# Patient Record
Sex: FEMALE | Race: WHITE | NOT HISPANIC OR LATINO | Employment: FULL TIME | URBAN - METROPOLITAN AREA
[De-identification: names, ages, dates, MRNs, and addresses within clinical notes are randomized per-mention and may not be internally consistent; named-entity substitution may affect disease eponyms.]

---

## 2017-04-28 DIAGNOSIS — I25.10 CORONARY ATHEROSCLEROSIS OF NATIVE CORONARY ARTERY: ICD-10-CM

## 2017-04-28 RX ORDER — PRASUGREL 10 MG/1
10 TABLET, FILM COATED ORAL DAILY
Qty: 90 TABLET | Refills: 1 | Status: SHIPPED | OUTPATIENT
Start: 2017-04-28 | End: 2017-05-02

## 2017-04-28 RX ORDER — PRASUGREL 10 MG/1
10 TABLET, FILM COATED ORAL DAILY
Qty: 30 TABLET | Refills: 0 | Status: SHIPPED | OUTPATIENT
Start: 2017-04-28 | End: 2017-04-28

## 2017-05-02 ENCOUNTER — OFFICE VISIT (OUTPATIENT)
Dept: CARDIOLOGY | Facility: CLINIC | Age: 57
End: 2017-05-02
Payer: COMMERCIAL

## 2017-05-02 VITALS
BODY MASS INDEX: 26.05 KG/M2 | WEIGHT: 147 LBS | HEART RATE: 69 BPM | SYSTOLIC BLOOD PRESSURE: 123 MMHG | DIASTOLIC BLOOD PRESSURE: 79 MMHG | HEIGHT: 63 IN

## 2017-05-02 DIAGNOSIS — I21.09 ACUTE ANTERIOR WALL MI (H): Primary | ICD-10-CM

## 2017-05-02 DIAGNOSIS — I25.10 CORONARY ARTERY DISEASE INVOLVING NATIVE CORONARY ARTERY OF NATIVE HEART WITHOUT ANGINA PECTORIS: ICD-10-CM

## 2017-05-02 PROCEDURE — 99214 OFFICE O/P EST MOD 30 MIN: CPT | Performed by: INTERNAL MEDICINE

## 2017-05-02 RX ORDER — LOSARTAN POTASSIUM 25 MG/1
12.5 TABLET ORAL DAILY
Qty: 15 TABLET | Refills: 11 | Status: SHIPPED | OUTPATIENT
Start: 2017-05-02 | End: 2017-07-10

## 2017-05-02 NOTE — MR AVS SNAPSHOT
After Visit Summary   5/2/2017    Jaymie Mares    MRN: [id number]           Patient Information     Date Of Birth          1960        Visit Information        Provider Department      5/2/2017 4:00 PM Hector Chavez MD [facility]        Today's Diagnoses     Acute anterior wall MI (H)    -  1    Coronary artery disease involving native coronary artery of native heart without angina pectoris           Follow-ups after your visit        Additional Services     Follow-Up with Cardiologist                 Future tests that were ordered for you today     Open Future Orders        Priority Expected Expires Ordered    ALT Routine 5/2/2018 6/6/2018 5/2/2017    Follow-Up with Cardiologist Routine 5/2/2018 9/14/2018 5/2/2017    Lipid Profile Routine 5/2/2018 6/6/2018 5/2/2017            Who to contact     If you have questions or need follow up information about today's clinic visit or your schedule please contact [facility] directly at [phone].  Normal or non-critical lab and imaging results will be communicated to you by AmSafehart, letter or phone within 4 business days after the clinic has received the results. If you do not hear from us within 7 days, please contact the clinic through [employer] or phone. If you have a critical or abnormal lab result, we will notify you by phone as soon as possible.  Submit refill requests through Zaplox or call your pharmacy and they will forward the refill request to us. Please allow 3 business days for your refill to be completed.          Additional Information About Your Visit        AmSafehart Information     Zaplox gives you secure access to your electronic health record. If you see a primary care provider, you can also send messages to your care team and make appointments. If you have questions, please call your primary care clinic.  If you do not have a primary  "care provider, please call [phone] and they will assist you.        Care EveryWhere ID     This is your Care EveryWhere ID. This could be used by other organizations to access your [facility] medical records  [id number]        Your Vitals Were     Pulse Height BMI (Body Mass Index)             69 1.6 m (5' 3\") 26.04 kg/m2          Blood Pressure from Last 3 Encounters:   05/02/17 123/79   11/03/16 122/70   10/11/16 126/80    Weight from Last 3 Encounters:   05/02/17 66.7 kg (147 lb)   11/03/16 65.4 kg (144 lb 3.2 oz)   04/06/16 64.4 kg (142 lb)                 Today's Medication Changes          These changes are accurate as of: 5/2/17  4:32 PM.  If you have any questions, ask your nurse or doctor.               Start taking these medicines.        Dose/Directions    losartan 25 MG tablet   Commonly known as:  COZAAR   Used for:  Acute anterior wall MI (H), Coronary artery disease involving native coronary artery of native heart without angina pectoris   Started by:  Hector Chavez MD        Dose:  12.5 mg   Take 0.5 tablets (12.5 mg) by mouth daily   Quantity:  15 tablet   Refills:  11         Stop taking these medicines if you haven't already. Please contact your care team if you have questions.     prasugrel 10 MG Tabs tablet   Commonly known as:  EFFIENT   Stopped by:  Hector Chavez MD                Where to get your medicines      These medications were sent to [facility]/pharmacy #[id number] [place], [place] - [address] [address]     Phone:  [phone]     losartan 25 MG tablet                Primary Care Provider Office Phone # Fax #    Bárbara Esposito [phone]       [facility] [address] [facility] [address]        Thank you!     Thank you for choosing [facility] [place]  for your care. Our goal is always to provide you with excellent care. Hearing back from our patients " is one way we can continue to improve our services. Please take a few minutes to complete the written survey that you may receive in the mail after your visit with us. Thank you!             Your Updated Medication List - Protect others around you: Learn how to safely use, store and throw away your medicines at www.disposemymeds.org.          This list is accurate as of: 5/2/17  4:32 PM.  Always use your most recent med list.                   Brand Name Dispense Instructions for use    aspirin 81 MG tablet     100 tablet    Take 1 tablet (81 mg) by mouth daily INDICATION: FOR HEART AND CARDIOVASCULAR HEALTH       [id number] Tabs     100 tablet    Take 1 capsule by mouth daily INDICATION: THIAMINE/VITAMIN DEFICIENCY (MUST HAVE AT LEAST 100 MG OF THIAMINE)       Calcium Carb-Cholecalciferol 1000-800 MG-UNIT Tabs    CALCIUM 1000 + D    100 tablet    Take 1 tablet by mouth daily TAKE WITH FOOD, FOR BONE HEALTH AND FOR VITAMIN D SUPPLEMENTATION       folic acid 1 MG tablet    FOLVITE    100 tablet    Take 1 tablet (1 mg) by mouth daily INDICATION: VITAMIN SUPPLEMENTATION       * levothyroxine 125 MCG tablet    SYNTHROID/LEVOTHROID    90 tablet    Take 1 tablet (125 mcg) by mouth daily INDICATION: THYROID SUPPLEMENTATION, TAKE FIRST THING IN THE MORNING ON AN EMPTY STOMACH,  NO FOOD FOR AN HOUR       * levothyroxine 25 MCG tablet    SYNTHROID/LEVOTHROID    30 tablet    Take 1 tablet (25 mcg) by mouth daily INDICATION: THYROID SUPPLEMENTATION, TAKE FIRST THING IN THE MORNING ON AN EMPTY STOMACH, NO FOOD FOR AN HOUR, SATURDAYS AND SUNDAYS ONLY       losartan 25 MG tablet    COZAAR    15 tablet    Take 0.5 tablets (12.5 mg) by mouth daily       metoprolol 100 MG 24 hr tablet    TOPROL-XL    90 tablet    Take 0.5 tablets (50 mg) by mouth At Bedtime TO LOWER BLOOD PRESSURE AND FOR HEART HEALTH       rosuvastatin 20 MG tablet    CRESTOR    90 tablet    Take 1 tablet (20 mg) by mouth daily INDICATION: TO LOWER CHOLESTEROL  AND TO HELP REDUCE RISK OF HEART ATTACK AND STROKE       vitamin D3 17321 UNITS capsule    CHOLECALCIFEROL    40 capsule    TAKE ONE CAP EVERY OTHER WEEK, FOR VITAMIN D DEFICIENCY (1ST AND 15TH OF EACH MONTH)       * Notice:  This list has 2 medication(s) that are the same as other medications prescribed for you. Read the directions carefully, and ask your doctor or other care provider to review them with you.

## 2017-05-02 NOTE — PROGRESS NOTES
HPI and Plan:   See dictation    Orders Placed This Encounter   Procedures     Lipid Profile     ALT     Follow-Up with Cardiologist       Orders Placed This Encounter   Medications     losartan (COZAAR) 25 MG tablet     Sig: Take 0.5 tablets (12.5 mg) by mouth daily     Dispense:  15 tablet     Refill:  11       Medications Discontinued During This Encounter   Medication Reason     prasugrel (EFFIENT) 10 MG TABS tablet          Encounter Diagnoses   Name Primary?     Acute anterior wall MI (H) Yes     Coronary artery disease involving native coronary artery of native heart without angina pectoris        CURRENT MEDICATIONS:  Current Outpatient Prescriptions   Medication Sig Dispense Refill     losartan (COZAAR) 25 MG tablet Take 0.5 tablets (12.5 mg) by mouth daily 15 tablet 11     rosuvastatin (CRESTOR) 20 MG tablet Take 1 tablet (20 mg) by mouth daily INDICATION: TO LOWER CHOLESTEROL AND TO HELP REDUCE RISK OF HEART ATTACK AND STROKE 90 tablet 3     levothyroxine (SYNTHROID, LEVOTHROID) 125 MCG tablet Take 1 tablet (125 mcg) by mouth daily INDICATION: THYROID SUPPLEMENTATION, TAKE FIRST THING IN THE MORNING ON AN EMPTY STOMACH,  NO FOOD FOR AN HOUR 90 tablet PRN     levothyroxine (SYNTHROID, LEVOTHROID) 25 MCG tablet Take 1 tablet (25 mcg) by mouth daily INDICATION: THYROID SUPPLEMENTATION, TAKE FIRST THING IN THE MORNING ON AN EMPTY STOMACH, NO FOOD FOR AN HOUR, SATURDAYS AND SUNDAYS ONLY 30 tablet 3     folic acid (FOLVITE) 1 MG tablet Take 1 tablet (1 mg) by mouth daily INDICATION: VITAMIN SUPPLEMENTATION 100 tablet 3     B Complex-Biotin-FA ([id number]) TABS Take 1 capsule by mouth daily INDICATION: THIAMINE/VITAMIN DEFICIENCY (MUST HAVE AT LEAST 100 MG OF THIAMINE) 100 tablet PRN     Calcium Carb-Cholecalciferol (CALCIUM 1000 + D) 1000-800 MG-UNIT TABS Take 1 tablet by mouth daily TAKE WITH FOOD, FOR BONE HEALTH AND FOR VITAMIN D SUPPLEMENTATION 100 tablet PRN     aspirin 81 MG tablet Take 1 tablet (81  mg) by mouth daily INDICATION: FOR HEART AND CARDIOVASCULAR HEALTH 100 tablet 3     metoprolol (TOPROL-XL) 100 MG 24 hr tablet Take 0.5 tablets (50 mg) by mouth At Bedtime TO LOWER BLOOD PRESSURE AND FOR HEART HEALTH 90 tablet 2     vitamin D3 (CHOLECALCIFEROL) 38328 UNITS capsule TAKE ONE CAP EVERY OTHER WEEK, FOR VITAMIN D DEFICIENCY (1ST AND 15TH OF EACH MONTH) 40 capsule PRN       ALLERGIES     Allergies   Allergen Reactions     Lisinopril      hypotension     Sulfa Drugs      Zithromax [Azithromycin]        PAST MEDICAL HISTORY:  Past Medical History:   Diagnosis Date     Cardiac arrest (H)     V fib cardiac arrest. February 2010     Coronary artery disease      Hyperlipidaemia      Hypothyroidism      Ischemic cardiomyopathy     EF now normalized     Myocardial infarction (H)     Anterior MI February 2010       PAST SURGICAL HISTORY:  Past Surgical History:   Procedure Laterality Date     CARDIAC CATHERIZATION       CORONARY ANGIOGRAPHY ADULT ORDER      Emergent cath 2/21/10- SUPRIYA to proximal LAD       FAMILY HISTORY:  Family History   Problem Relation Age of Onset     CANCER Father 74     LUNG     Prostate Cancer Father      GASTROINTESTINAL DISEASE Mother        SOCIAL HISTORY:  Social History     Social History     Marital status:      Spouse name: N/A     Number of children: N/A     Years of education: N/A     Social History Main Topics     Smoking status: Former Smoker     Packs/day: 0.20     Years: 15.00     Types: Cigarettes     Quit date: 2/12/2010     Smokeless tobacco: Never Used     Alcohol use Yes      Comment: Occasional glass of wine.     Drug use: No     Sexual activity: Yes     Partners: Male     Other Topics Concern     None     Social History Narrative       Review of Systems:  Skin:  Negative       Eyes:  Positive for glasses    ENT:  Negative      Respiratory:  Negative       Cardiovascular:  Negative      Gastroenterology: Negative      Genitourinary:  Negative      Musculoskeletal:   "Negative      Neurologic:  Negative      Psychiatric:  Negative      Heme/Lymph/Imm:  Negative      Endocrine:  Positive for thyroid disorder      Physical Exam:  Vitals: /79  Pulse 69  Ht 1.6 m (5' 3\")  Wt 66.7 kg (147 lb)  BMI 26.04 kg/m2    Constitutional:  cooperative, alert and oriented, well developed, well nourished, in no acute distress        Skin:  warm and dry to the touch, no apparent skin lesions or masses noted        Head:  normocephalic, no masses or lesions        Eyes:  pupils equal and round, conjunctivae and lids unremarkable, sclera white, no xanthalasma, EOMS intact, no nystagmus        ENT:  no pallor or cyanosis, dentition good        Neck:  carotid pulses are full and equal bilaterally, JVP normal, no carotid bruit, no thyromegaly        Chest:  normal breath sounds, clear to auscultation, normal A-P diameter, normal symmetry, normal respiratory excursion, no use of accessory muscles          Cardiac: regular rhythm, normal S1/S2, no S3 or S4, apical impulse not displaced, no murmurs, gallops or rubs                  Abdomen:  abdomen soft, non-tender, BS normoactive, no mass, no HSM, no bruits        Vascular: pulses full and equal, no bruits auscultated                                        Extremities and Back:  no deformities, clubbing, cyanosis, erythema observed              Neurological:  affect appropriate, oriented to time, person and place;no gross motor deficits              CC  No referring provider defined for this encounter.              "

## 2017-05-02 NOTE — LETTER
5/2/2017    Bárbara Esposito MD  [facility]   [address] [facility] [address]    RE: Jaymie Mares       Dear Colleague,    It was my pleasure to see your patient, Jaymie Mares, in followup.  As you know, this is a patient who suffered an acute anterior myocardial infarct approximately 7 years ago.  This resulted in a VF arrest.  She was resuscitated and was found to have an occluded left anterior descending artery, which was intervened upon and stented.  The patient had 10%-20% disease in the proximal to midsegment, minimal luminal irregularities were in the left circumflex artery.        Fortunately for the patient, her ejection fraction did return to normal.  Her left ventricular function on left ventricular angiography at the time of the infarct was felt to be severely reduced with an EF of approximately 25%.  Her most recent echocardiogram was performed on 11/16/2015 showing an EF of 55%-60% with no wall motion abnormalities.  She has no significant valvular heart disease either.        It does appear that the patient's major risk factor for coronary artery disease with smoking.  She has never smoked since the infarct.  I do note that the patient is on beta blockers but she is not on an ACE inhibitor.  Looking back at Dr. Amador's note, who was her original cardiologist until he retired from our group, she became symptomatically hypotensive with even the lowest doses of lisinopril.        Last lipid profile was on 10/24 showing an LDL of 106, HDL of 89 and triglycerides of 149 with a total cholesterol of 225.  This is significantly worse than her cholesterol on 05/08/2014 when her LDL was 41, HDL was 102 and triglycerides were 63.  I believe that you changed her statin to rosuvastatin 20 mg per day.  The patient has been on prasugrel for the last 7 years which is a long time to be on this medication.  Dual-antiplatelet therapy is usually continued long-term in patients who have more  diffuse disease or ongoing risk factors such as diabetes mellitus.  I do not think that this patient falls into that category.  She only had significant LAD disease.  Her circumflex was near normal and her right coronary artery had only trivial disease, and she of course has stopped her major risk factor, which was smoking.  Her blood pressure is well controlled here today at 123/79.        The patient is asymptomatic with no chest pains, no chest pressure and no unusual shortness of breath.     Outpatient Encounter Prescriptions as of 5/2/2017   Medication Sig Dispense Refill     losartan (COZAAR) 25 MG tablet Take 0.5 tablets (12.5 mg) by mouth daily 15 tablet 11     rosuvastatin (CRESTOR) 20 MG tablet Take 1 tablet (20 mg) by mouth daily INDICATION: TO LOWER CHOLESTEROL AND TO HELP REDUCE RISK OF HEART ATTACK AND STROKE 90 tablet 3     levothyroxine (SYNTHROID, LEVOTHROID) 125 MCG tablet Take 1 tablet (125 mcg) by mouth daily INDICATION: THYROID SUPPLEMENTATION, TAKE FIRST THING IN THE MORNING ON AN EMPTY STOMACH,  NO FOOD FOR AN HOUR 90 tablet PRN     levothyroxine (SYNTHROID, LEVOTHROID) 25 MCG tablet Take 1 tablet (25 mcg) by mouth daily INDICATION: THYROID SUPPLEMENTATION, TAKE FIRST THING IN THE MORNING ON AN EMPTY STOMACH, NO FOOD FOR AN HOUR, SATURDAYS AND SUNDAYS ONLY 30 tablet 3     folic acid (FOLVITE) 1 MG tablet Take 1 tablet (1 mg) by mouth daily INDICATION: VITAMIN SUPPLEMENTATION 100 tablet 3     B Complex-Biotin-FA ([id number]) TABS Take 1 capsule by mouth daily INDICATION: THIAMINE/VITAMIN DEFICIENCY (MUST HAVE AT LEAST 100 MG OF THIAMINE) 100 tablet PRN     Calcium Carb-Cholecalciferol (CALCIUM 1000 + D) 1000-800 MG-UNIT TABS Take 1 tablet by mouth daily TAKE WITH FOOD, FOR BONE HEALTH AND FOR VITAMIN D SUPPLEMENTATION 100 tablet PRN     aspirin 81 MG tablet Take 1 tablet (81 mg) by mouth daily INDICATION: FOR HEART AND CARDIOVASCULAR HEALTH 100 tablet 3     metoprolol (TOPROL-XL) 100 MG 24  hr tablet Take 0.5 tablets (50 mg) by mouth At Bedtime TO LOWER BLOOD PRESSURE AND FOR HEART HEALTH 90 tablet 2     vitamin D3 (CHOLECALCIFEROL) 60266 UNITS capsule TAKE ONE CAP EVERY OTHER WEEK, FOR VITAMIN D DEFICIENCY (1ST AND 15TH OF EACH MONTH) 40 capsule PRN     [DISCONTINUED] prasugrel (EFFIENT) 10 MG TABS tablet Take 1 tablet (10 mg) by mouth daily 90 tablet 1     No facility-administered encounter medications on file as of 5/2/2017.       IMPRESSION:   1.  Status post anterior myocardial infarct and resuscitated VF arrest.  The patient is doing remarkably well and her ejection fraction is normal and she has no symptoms of angina pectoris.   2.  Lipid profile in 10/2016 was not near as good as previous lipid profiles.  She is due to have lipids rechecked at your office, I believe, at the end of this month or early next month.     3.  The patient has been on long-term prasugrel for 7 years.  She does have increased bruising.  She probably does not need to be on prasugrel at this stage as she does not fulfill the usual criteria used for long-term dual antiplatelet therapy.      PLAN:     1.  Firstly, I would stop prasugrel medication, but she must continue the aspirin 81 mg per day.     2.  Secondly, we would certainly like to see the LDL cholesterol significantly lower than it was in October, but I know that you are checking her lipids in the next few weeks.     3.  Though she did not tolerate low-dose lisinopril, I would like to see if she could tolerate low dose losartan 12.5 mg.  She is taking the metoprolol at nighttime, so I have asked her to take the losartan in the morning so she does not get a combined peak dose effect.  If she starts to become dizzy and lightheaded, I told her to stop the losartan immediately and let us know.        It has been a pleasure to be involved in the care of this really pleasant patient who has done so well after a life-threatening event.  As always, she has been told to  contact me if she has any questions or any concerns.     Sincerely,    Hector Chavez MD    [facility]

## 2017-05-03 NOTE — PROGRESS NOTES
HISTORY OF PRESENT ILLNESS:  It was my pleasure to see your patient, Jaymie Mares, in followup.  As you know, this is a patient who suffered an acute anterior myocardial infarct approximately 7 years ago.  This resulted in a VF arrest.  She was resuscitated and was found to have an occluded left anterior descending artery, which was intervened upon and stented.  The patient had 10%-20% disease in the proximal to midsegment, minimal luminal irregularities were in the left circumflex artery.        Fortunately for the patient, her ejection fraction did return to normal.  Her left ventricular function on left ventricular angiography at the time of the infarct was felt to be severely reduced with an EF of approximately 25%.  Her most recent echocardiogram was performed on 11/16/2015 showing an EF of 55%-60% with no wall motion abnormalities.  She has no significant valvular heart disease either.        It does appear that the patient's major risk factor for coronary artery disease with smoking.  She has never smoked since the infarct.  I do note that the patient is on beta blockers but she is not on an ACE inhibitor.  Looking back at Dr. Amador's note, who was her original cardiologist until he retired from our group, she became symptomatically hypotensive with even the lowest doses of lisinopril.        Last lipid profile was on 10/24 showing an LDL of 106, HDL of 89 and triglycerides of 149 with a total cholesterol of 225.  This is significantly worse than her cholesterol on 05/08/2014 when her LDL was 41, HDL was 102 and triglycerides were 63.  I believe that you changed her statin to rosuvastatin 20 mg per day.  The patient has been on prasugrel for the last 7 years which is a long time to be on this medication.  Dual-antiplatelet therapy is usually continued long-term in patients who have more diffuse disease or ongoing risk factors such as diabetes mellitus.  I do not think that this patient falls into that  category.  She only had significant LAD disease.  Her circumflex was near normal and her right coronary artery had only trivial disease, and she of course has stopped her major risk factor, which was smoking.  Her blood pressure is well controlled here today at 123/79.        The patient is asymptomatic with no chest pains, no chest pressure and no unusual shortness of breath.      IMPRESSION:   1.  Status post anterior myocardial infarct and resuscitated VF arrest.  The patient is doing remarkably well and her ejection fraction is normal and she has no symptoms of angina pectoris.   2.  Lipid profile in 10/2016 was not near as good as previous lipid profiles.  She is due to have lipids rechecked at your office, I believe, at the end of this month or early next month.     3.  The patient has been on long-term prasugrel for 7 years.  She does have increased bruising.  She probably does not need to be on prasugrel at this stage as she does not fulfill the usual criteria used for long-term dual antiplatelet therapy.      PLAN:     1.  Firstly, I would stop prasugrel medication, but she must continue the aspirin 81 mg per day.     2.  Secondly, we would certainly like to see the LDL cholesterol significantly lower than it was in October, but I know that you are checking her lipids in the next few weeks.     3.  Though she did not tolerate low-dose lisinopril, I would like to see if she could tolerate low dose losartan 12.5 mg.  She is taking the metoprolol at nighttime, so I have asked her to take the losartan in the morning so she does not get a combined peak dose effect.  If she starts to become dizzy and lightheaded, I told her to stop the losartan immediately and let us know.        It has been a pleasure to be involved in the care of this really pleasant patient who has done so well after a life-threatening event.  As always, she has been told to contact me if she has any questions or any concerns.      cc:    Bárbara Esposito MD    [facility]   [address] [place] [address]         BEVERLY RUBALCAVA MD, [facility]             D: 2017 16:31   T: 2017 12:29   MT: MALISSA      Name:     GLENDA FULTON   MRN:      [id number]        Account:      [id number]   :      1960           Service Date: 2017      Document: [id number]

## 2017-05-08 DIAGNOSIS — I25.2 PAST MYOCARDIAL INFARCTION: ICD-10-CM

## 2017-05-09 RX ORDER — METOPROLOL SUCCINATE 100 MG/1
TABLET, EXTENDED RELEASE ORAL
Qty: 90 TABLET | Refills: 1 | Status: SHIPPED | OUTPATIENT
Start: 2017-05-09 | End: 2017-10-23

## 2017-05-09 NOTE — TELEPHONE ENCOUNTER
Prescription approved per [facility] Refill Protocol.

## 2017-07-10 DIAGNOSIS — I25.10 CORONARY ARTERY DISEASE INVOLVING NATIVE CORONARY ARTERY OF NATIVE HEART WITHOUT ANGINA PECTORIS: ICD-10-CM

## 2017-07-10 DIAGNOSIS — I21.09 ACUTE ANTERIOR WALL MI (H): ICD-10-CM

## 2017-07-10 RX ORDER — LOSARTAN POTASSIUM 25 MG/1
12.5 TABLET ORAL DAILY
Qty: 15 TABLET | Refills: 9 | Status: SHIPPED | OUTPATIENT
Start: 2017-07-10 | End: 2017-07-27

## 2017-07-27 ENCOUNTER — TELEPHONE (OUTPATIENT)
Dept: NURSING | Facility: CLINIC | Age: 57
End: 2017-07-27

## 2017-07-27 ENCOUNTER — NURSE TRIAGE (OUTPATIENT)
Dept: NURSING | Facility: CLINIC | Age: 57
End: 2017-07-27

## 2017-07-27 DIAGNOSIS — I21.09 ACUTE ANTERIOR WALL MI (H): ICD-10-CM

## 2017-07-27 DIAGNOSIS — I25.10 CORONARY ARTERY DISEASE INVOLVING NATIVE CORONARY ARTERY OF NATIVE HEART WITHOUT ANGINA PECTORIS: ICD-10-CM

## 2017-07-27 RX ORDER — LOSARTAN POTASSIUM 25 MG/1
12.5 TABLET ORAL DAILY
Qty: 45 TABLET | Refills: 3 | Status: SHIPPED | OUTPATIENT
Start: 2017-07-27 | End: 2017-10-23

## 2017-07-27 NOTE — TELEPHONE ENCOUNTER
Needs 90 day supply of medication for insurance to pay for it.  Cozzar new RX needed. Pharmacy [facility] in [place] at [address].  Please call.   Vaishnavi Figueroa RN-[facility]

## 2017-09-05 ENCOUNTER — MYC MEDICAL ADVICE (OUTPATIENT)
Dept: INTERNAL MEDICINE | Facility: CLINIC | Age: 57
End: 2017-09-05

## 2017-09-20 NOTE — TELEPHONE ENCOUNTER
Pt called back.    Pt is scheduled for 10/19  With a Lab appt 1 week earlier  Please place labs you want drawn

## 2017-10-12 DIAGNOSIS — E78.5 HYPERLIPIDEMIA LDL GOAL <70: ICD-10-CM

## 2017-10-12 DIAGNOSIS — E83.19 IRON EXCESS: ICD-10-CM

## 2017-10-12 DIAGNOSIS — E03.9 HYPOTHYROIDISM, UNSPECIFIED TYPE: ICD-10-CM

## 2017-10-12 DIAGNOSIS — E56.9 VITAMIN DEFICIENCY: ICD-10-CM

## 2017-10-12 LAB
ALBUMIN SERPL-MCNC: 3.8 G/DL (ref 3.4–5)
ALP SERPL-CCNC: 81 U/L (ref 40–150)
ALT SERPL W P-5'-P-CCNC: 28 U/L (ref 0–50)
ANION GAP SERPL CALCULATED.3IONS-SCNC: 9 MMOL/L (ref 3–14)
AST SERPL W P-5'-P-CCNC: 18 U/L (ref 0–45)
BILIRUB SERPL-MCNC: 0.4 MG/DL (ref 0.2–1.3)
BUN SERPL-MCNC: 16 MG/DL (ref 7–30)
CALCIUM SERPL-MCNC: 8.9 MG/DL (ref 8.5–10.1)
CHLORIDE SERPL-SCNC: 108 MMOL/L (ref 94–109)
CHOLEST SERPL-MCNC: 145 MG/DL
CO2 SERPL-SCNC: 23 MMOL/L (ref 20–32)
CREAT SERPL-MCNC: 1.11 MG/DL (ref 0.52–1.04)
FERRITIN SERPL-MCNC: 159 NG/ML (ref 8–252)
GFR SERPL CREATININE-BSD FRML MDRD: 51 ML/MIN/1.7M2
GLUCOSE SERPL-MCNC: 92 MG/DL (ref 70–99)
HDLC SERPL-MCNC: 101 MG/DL
IRON SATN MFR SERPL: 29 % (ref 15–46)
IRON SERPL-MCNC: 99 UG/DL (ref 35–180)
LDLC SERPL CALC-MCNC: 25 MG/DL
NONHDLC SERPL-MCNC: 44 MG/DL
POTASSIUM SERPL-SCNC: 4 MMOL/L (ref 3.4–5.3)
PROT SERPL-MCNC: 7.7 G/DL (ref 6.8–8.8)
SODIUM SERPL-SCNC: 140 MMOL/L (ref 133–144)
TIBC SERPL-MCNC: 341 UG/DL (ref 240–430)
TRIGL SERPL-MCNC: 94 MG/DL
TSH SERPL DL<=0.005 MIU/L-ACNC: 2.53 MU/L (ref 0.4–4)
VIT B12 SERPL-MCNC: 1401 PG/ML (ref 193–986)

## 2017-10-12 PROCEDURE — 82728 ASSAY OF FERRITIN: CPT | Performed by: INTERNAL MEDICINE

## 2017-10-12 PROCEDURE — 82607 VITAMIN B-12: CPT | Performed by: INTERNAL MEDICINE

## 2017-10-12 PROCEDURE — 36415 COLL VENOUS BLD VENIPUNCTURE: CPT | Performed by: INTERNAL MEDICINE

## 2017-10-12 PROCEDURE — 83540 ASSAY OF IRON: CPT | Performed by: INTERNAL MEDICINE

## 2017-10-12 PROCEDURE — 83550 IRON BINDING TEST: CPT | Performed by: INTERNAL MEDICINE

## 2017-10-12 PROCEDURE — 80061 LIPID PANEL: CPT | Performed by: INTERNAL MEDICINE

## 2017-10-12 PROCEDURE — 84443 ASSAY THYROID STIM HORMONE: CPT | Performed by: INTERNAL MEDICINE

## 2017-10-12 PROCEDURE — 80053 COMPREHEN METABOLIC PANEL: CPT | Performed by: INTERNAL MEDICINE

## 2017-10-23 ENCOUNTER — OFFICE VISIT (OUTPATIENT)
Dept: INTERNAL MEDICINE | Facility: CLINIC | Age: 57
End: 2017-10-23
Payer: COMMERCIAL

## 2017-10-23 VITALS
HEART RATE: 85 BPM | BODY MASS INDEX: 26.36 KG/M2 | TEMPERATURE: 98.7 F | RESPIRATION RATE: 18 BRPM | DIASTOLIC BLOOD PRESSURE: 70 MMHG | SYSTOLIC BLOOD PRESSURE: 110 MMHG | OXYGEN SATURATION: 98 % | WEIGHT: 148.8 LBS

## 2017-10-23 DIAGNOSIS — Z78.0 ASYMPTOMATIC POSTMENOPAUSAL STATUS: ICD-10-CM

## 2017-10-23 DIAGNOSIS — Z12.31 VISIT FOR SCREENING MAMMOGRAM: ICD-10-CM

## 2017-10-23 DIAGNOSIS — Z23 NEED FOR PROPHYLACTIC VACCINATION AND INOCULATION AGAINST INFLUENZA: ICD-10-CM

## 2017-10-23 DIAGNOSIS — I21.09 ACUTE ANTERIOR WALL MI (H): ICD-10-CM

## 2017-10-23 DIAGNOSIS — E55.9 VITAMIN D DEFICIENCY: ICD-10-CM

## 2017-10-23 DIAGNOSIS — E03.9 HYPOTHYROIDISM, UNSPECIFIED TYPE: ICD-10-CM

## 2017-10-23 DIAGNOSIS — I25.10 CORONARY ARTERY DISEASE INVOLVING NATIVE CORONARY ARTERY OF NATIVE HEART WITHOUT ANGINA PECTORIS: Primary | ICD-10-CM

## 2017-10-23 DIAGNOSIS — E56.9 VITAMIN DEFICIENCY: ICD-10-CM

## 2017-10-23 DIAGNOSIS — E78.5 HYPERLIPIDEMIA LDL GOAL <70: ICD-10-CM

## 2017-10-23 DIAGNOSIS — I25.2 PAST MYOCARDIAL INFARCTION: ICD-10-CM

## 2017-10-23 DIAGNOSIS — Z12.11 SPECIAL SCREENING FOR MALIGNANT NEOPLASMS, COLON: ICD-10-CM

## 2017-10-23 DIAGNOSIS — E51.11 THIAMINE DEFICIENCY NEUROPATHY: ICD-10-CM

## 2017-10-23 DIAGNOSIS — R79.89 ELEVATED SERUM CREATININE: ICD-10-CM

## 2017-10-23 DIAGNOSIS — Z87.891 HISTORY OF TOBACCO USE: ICD-10-CM

## 2017-10-23 LAB
ANION GAP SERPL CALCULATED.3IONS-SCNC: 6 MMOL/L (ref 3–14)
BUN SERPL-MCNC: 14 MG/DL (ref 7–30)
CALCIUM SERPL-MCNC: 9 MG/DL (ref 8.5–10.1)
CHLORIDE SERPL-SCNC: 109 MMOL/L (ref 94–109)
CO2 SERPL-SCNC: 26 MMOL/L (ref 20–32)
CREAT SERPL-MCNC: 1.04 MG/DL (ref 0.52–1.04)
GFR SERPL CREATININE-BSD FRML MDRD: 55 ML/MIN/1.7M2
GLUCOSE SERPL-MCNC: 97 MG/DL (ref 70–99)
POTASSIUM SERPL-SCNC: 4 MMOL/L (ref 3.4–5.3)
SODIUM SERPL-SCNC: 141 MMOL/L (ref 133–144)

## 2017-10-23 PROCEDURE — 99214 OFFICE O/P EST MOD 30 MIN: CPT | Mod: 25 | Performed by: INTERNAL MEDICINE

## 2017-10-23 PROCEDURE — 82306 VITAMIN D 25 HYDROXY: CPT | Performed by: INTERNAL MEDICINE

## 2017-10-23 PROCEDURE — 80048 BASIC METABOLIC PNL TOTAL CA: CPT | Performed by: INTERNAL MEDICINE

## 2017-10-23 PROCEDURE — 90471 IMMUNIZATION ADMIN: CPT | Performed by: INTERNAL MEDICINE

## 2017-10-23 PROCEDURE — 36415 COLL VENOUS BLD VENIPUNCTURE: CPT | Performed by: INTERNAL MEDICINE

## 2017-10-23 PROCEDURE — 90686 IIV4 VACC NO PRSV 0.5 ML IM: CPT | Performed by: INTERNAL MEDICINE

## 2017-10-23 PROCEDURE — G0296 VISIT TO DETERM LDCT ELIG: HCPCS | Performed by: INTERNAL MEDICINE

## 2017-10-23 RX ORDER — B-COMPLEX WITH VITAMIN C
1 TABLET ORAL DAILY
Qty: 100 TABLET | Status: SHIPPED | OUTPATIENT
Start: 2017-10-23

## 2017-10-23 RX ORDER — ROSUVASTATIN CALCIUM 20 MG/1
20 TABLET, COATED ORAL DAILY
Qty: 90 TABLET | Refills: 3 | Status: SHIPPED | OUTPATIENT
Start: 2017-10-23 | End: 2017-10-23

## 2017-10-23 RX ORDER — ROSUVASTATIN CALCIUM 20 MG/1
20 TABLET, COATED ORAL DAILY
Qty: 90 TABLET | Refills: 3 | Status: SHIPPED | OUTPATIENT
Start: 2017-10-23 | End: 2021-06-15

## 2017-10-23 RX ORDER — FOLIC ACID 1 MG/1
1 TABLET ORAL DAILY
Qty: 100 TABLET | Refills: 3 | Status: SHIPPED | OUTPATIENT
Start: 2017-10-23 | End: 2017-10-23

## 2017-10-23 RX ORDER — CHOLECALCIFEROL (VITAMIN D3) 1250 MCG
CAPSULE ORAL
Qty: 40 CAPSULE | Status: SHIPPED | OUTPATIENT
Start: 2017-10-23

## 2017-10-23 RX ORDER — B-COMPLEX WITH VITAMIN C
1 TABLET ORAL DAILY
Qty: 100 TABLET | Status: SHIPPED | OUTPATIENT
Start: 2017-10-23 | End: 2017-10-23

## 2017-10-23 RX ORDER — LOSARTAN POTASSIUM 25 MG/1
12.5 TABLET ORAL DAILY
Qty: 45 TABLET | Refills: 3 | Status: SHIPPED | OUTPATIENT
Start: 2017-10-23 | End: 2018-11-15

## 2017-10-23 RX ORDER — LEVOTHYROXINE SODIUM 137 UG/1
137 TABLET ORAL DAILY
Qty: 90 TABLET | Refills: 3 | Status: SHIPPED | OUTPATIENT
Start: 2017-10-23 | End: 2022-05-10

## 2017-10-23 RX ORDER — FOLIC ACID 1 MG/1
1 TABLET ORAL DAILY
Qty: 100 TABLET | Refills: 3 | Status: SHIPPED | OUTPATIENT
Start: 2017-10-23 | End: 2022-05-09

## 2017-10-23 RX ORDER — LOSARTAN POTASSIUM 25 MG/1
12.5 TABLET ORAL DAILY
Qty: 45 TABLET | Refills: 3 | Status: SHIPPED | OUTPATIENT
Start: 2017-10-23 | End: 2017-10-23

## 2017-10-23 NOTE — NURSING NOTE
"Chief Complaint   Patient presents with     Thyroid Problem     Results     vitamin deficiencies     Lipids       Initial /70  Pulse 85  Temp 98.7  F (37.1  C) (Oral)  Resp 18  Wt 148 lb 12.8 oz (67.5 kg)  SpO2 98%  BMI 26.36 kg/m2 Estimated body mass index is 26.36 kg/(m^2) as calculated from the following:    Height as of 5/2/17: 5' 3\" (1.6 m).    Weight as of this encounter: 148 lb 12.8 oz (67.5 kg).  Blood pressure completed using cuff size: regular    "

## 2017-10-23 NOTE — PROGRESS NOTES
SUBJECTIVE:   Jaymie Mares is a 57 year old female who presents to clinic today for the following health issues:      Hyperlipidemia Follow-Up      Rate your low fat/cholesterol diet?: good    Taking statin?  Yes, no muscle aches from statin    Other lipid medications/supplements?:  none    Hypothyroidism Follow-up      Since last visit, patient describes the following symptoms: weight gain of 8 # lbs    Amount of exercise or physical activity: 4-5 days/week for an average of 45-60 minutes    Problems taking medications regularly: No    Medication side effects: none    Diet: regular (no restrictions), low salt and low fat/cholesterol        Jaymie reports feeling really good. she reports feeling better with taking vitamin supplements and has noted significant improvement with regards to fatigue and muscle aches and pains.  Alma denies shortness of breath, chest pain, or decreased exercise tolerance. She was recently seen by cardiology and given a clean bill of health. Her recent labs show that her cholesterol panel is pristine and LDL has decreased to 25 mg percent. Her diet is very healthy and she exercises for about 45 minutes 4 days a week by brisk walking and also does core exercises everyday.    Problem list and histories reviewed & adjusted, as indicated.  Additional history: as documented    Labs reviewed in EPIC    Reviewed and updated as needed this visit by clinical staffTobacco  Allergies  Med Hx  Surg Hx  Fam Hx  Soc Hx      She is however not up-to-date with regards to some health maintenance parameters. She is overdue for colonoscopy, she also needs to have a mammogram and bone density scan done.         ROS:  14 point ROS reviewed in detail and negative     OBJECTIVE:     /70  Pulse 85  Temp 98.7  F (37.1  C) (Oral)  Resp 18  Wt 148 lb 12.8 oz (67.5 kg)  SpO2 98%  BMI 26.36 kg/m2  Body mass index is 26.36 kg/(m^2).  GENERAL: healthy, alert and no distress  EYES: Eyes grossly  normal to inspection, PERRL and conjunctivae and sclerae normal  NECK: no adenopathy, no asymmetry, masses, or scars and thyroid normal to palpation  RESP: lungs clear to auscultation - no rales, rhonchi or wheezes  CV: regular rate and rhythm, normal S1 S2, no S3 or S4, no murmur, click or rub, no peripheral edema and peripheral pulses strong  ABDOMEN: soft, nontender, no hepatosplenomegaly, no masses and bowel sounds normal  MS: no gross musculoskeletal defects noted, no edema    Diagnostic Test Results:  Results for orders placed or performed in visit on 10/12/17   TSH   Result Value Ref Range    TSH 2.53 0.40 - 4.00 mU/L   Lipid panel reflex to direct LDL   Result Value Ref Range    Cholesterol 145 <200 mg/dL    Triglycerides 94 <150 mg/dL    HDL Cholesterol 101 >49 mg/dL    LDL Cholesterol Calculated 25 <100 mg/dL    Non HDL Cholesterol 44 <130 mg/dL   Comprehensive metabolic panel   Result Value Ref Range    Sodium 140 133 - 144 mmol/L    Potassium 4.0 3.4 - 5.3 mmol/L    Chloride 108 94 - 109 mmol/L    Carbon Dioxide 23 20 - 32 mmol/L    Anion Gap 9 3 - 14 mmol/L    Glucose 92 70 - 99 mg/dL    Urea Nitrogen 16 7 - 30 mg/dL    Creatinine 1.11 (H) 0.52 - 1.04 mg/dL    GFR Estimate 51 (L) >60 mL/min/1.7m2    GFR Estimate If Black 62 >60 mL/min/1.7m2    Calcium 8.9 8.5 - 10.1 mg/dL    Bilirubin Total 0.4 0.2 - 1.3 mg/dL    Albumin 3.8 3.4 - 5.0 g/dL    Protein Total 7.7 6.8 - 8.8 g/dL    Alkaline Phosphatase 81 40 - 150 U/L    ALT 28 0 - 50 U/L    AST 18 0 - 45 U/L   Vitamin B12   Result Value Ref Range    Vitamin B12 1401 (H) 193 - 986 pg/mL   Ferritin   Result Value Ref Range    Ferritin 159 8 - 252 ng/mL   Iron and iron binding capacity   Result Value Ref Range    Iron 99 35 - 180 ug/dL    Iron Binding Cap 341 240 - 430 ug/dL    Iron Saturation Index 29 15 - 46 %       ASSESSMENT/PLAN:     Hyperlipidemia; significantly improved and has exceeded goal   Plan:  No changes in the patient's current treatment plan,  even though LDL is well below goal I will keep her at the same dose of Crestor because of her history, significant family history of coronary artery disease, and the fact that when she was on a lower dose for LDL was not at goal albeit on a different statin.      Coronary Artery Disease (CAD); improved   Associated with the following complications:    None   Plan:  No changes in the patient's current treatment plan  She was recently switched from beta blocker to lisinopril to Cozaar. She has no complaints side effects with taking Toprol and lisinopril and given the fact that her EF is normal from echocardiogram done in 2015 she was switched to Cozaar and has tolerated it well so far.    DIAGNOSIS/PLAN:     ICD-10-CM    1. Coronary artery disease involving native coronary artery of native heart without angina pectoris I25.10 losartan (COZAAR) 25 MG tablet     DISCONTINUED: losartan (COZAAR) 25 MG tablet   2. Hypothyroidism, unspecified type E03.9 levothyroxine (SYNTHROID/LEVOTHROID) 137 MCG tablet     TSH     T4 free   3. Hyperlipidemia LDL goal <70 E78.5 rosuvastatin (CRESTOR) 20 MG tablet     DISCONTINUED: rosuvastatin (CRESTOR) 20 MG tablet     DISCONTINUED: rosuvastatin (CRESTOR) 20 MG tablet   4. Vitamin deficiency E56.9 folic acid (FOLVITE) 1 MG tablet     DISCONTINUED: folic acid (FOLVITE) 1 MG tablet     DISCONTINUED: folic acid (FOLVITE) 1 MG tablet   5. Thiamine deficiency neuropathy E51.11 B Complex-Biotin-FA ([id number]) TABS     DISCONTINUED: B Complex-Biotin-FA ([id number]) TABS     DISCONTINUED: B Complex-Biotin-FA ([id number]) TABS   6. Vitamin D deficiency E55.9 vitamin D3 (CHOLECALCIFEROL) 02774 UNITS capsule     Calcium Carb-Cholecalciferol (CALCIUM 1000 + D) 1000-800 MG-UNIT TABS     Vitamin D Deficiency     DISCONTINUED: Calcium Carb-Cholecalciferol (CALCIUM 1000 + D) 1000-800 MG-UNIT TABS     DISCONTINUED: Calcium Carb-Cholecalciferol (CALCIUM 1000 + D) 1000-800 MG-UNIT TABS   7.  Past myocardial infarction I25.2 aspirin 81 MG tablet     DISCONTINUED: aspirin 81 MG tablet     DISCONTINUED: aspirin 81 MG tablet    WITH V FIB AND CARDIAC ARREST AND NEED FOR MULTIPLE CARDIOVERSION    8. Acute anterior wall MI (H) I21.09 losartan (COZAAR) 25 MG tablet     DISCONTINUED: losartan (COZAAR) 25 MG tablet   9. Need for prophylactic vaccination and inoculation against influenza Z23 FLU VAC, SPLIT VIRUS IM > 3 YO (QUADRIVALENT) [59711]     Vaccine Administration, Initial [77876]   10. History of tobacco use Z87.891 Prof fee: Shared Decisionmaking for Lung Cancer Screening     CT Chest Lung Cancer Scrn Low Dose wo   11. Visit for screening mammogram Z12.31 MA Screening Digital Bilateral   12. Asymptomatic postmenopausal status Z78.0 DX Hip/Pelvis/Spine   13. Special screening for malignant neoplasms, colon Z12.11 GASTROENTEROLOGY ADULT REF PROCEDURE ONLY   14. Elevated serum creatinine R79.89 Basic metabolic panel       SIGNIFICANT ISSUES RE The primary encounter diagnosis was Coronary artery disease involving native coronary artery of native heart without angina pectoris. Diagnoses of Hypothyroidism, unspecified type, Hyperlipidemia LDL goal <70, Vitamin deficiency, Thiamine deficiency neuropathy, Vitamin D deficiency, Past myocardial infarction, Acute anterior wall MI (H), Need for prophylactic vaccination and inoculation against influenza, History of tobacco use, Visit for screening mammogram, Asymptomatic postmenopausal status, Special screening for malignant neoplasms, colon, and Elevated serum creatinine were also pertinent to this visit. AS NOTED AND ADDRESSED ABOVE   MEDS AND AND LABS AS ORDERED TO ADDRESS PREVIOUS AND CURRENT ABNORMAL INDICES    Jaymie is doing excellently well from a cardiac standpoint. And overall doing very very well. Her labs are all at goal except for slightly elevated serum creatinine which I suspect is because she was fasting when she came in for her labs. BMP will be  repeated today. She is to continue on all medications as ordered with no changes.    For health maintenance she has been referred for colonoscopy, she has also been referred for mammogram and bone density scan.    She complains that she still feels slightly fatigued and we have been moving her thyroid medications around a little bit. TSH from last labs were okay at 2.5 but given her symptoms I would recommend giving her just a little bit more thyroid supplement and so the dose was increased to 137  g daily of levothyroxine.      Given her roughly 30-pack-year history of smoking and family history positive for lung cancer in her dad I ordered a CT scan of the lungs for lung cancer screening purposes.    All other prescriptions were renewed today as requested.    SEE PATIENT INSTRUCTION SECTION FOR FOLLOW UP PLAN    Jaymie IS TO CONTINUE OTHER TREATMENT REGIMEN/PLANS EXCEPT AS INDICATED    COMPLIANCE WITH MEDICATIONS DIET AND EXERCISE PLANS ENCOURAGED    DISCONTINUED MEDS:  Medications Discontinued During This Encounter   Medication Reason     metoprolol (TOPROL-XL) 100 MG 24 hr tablet      metoprolol (TOPROL-XL) 100 MG 24 hr tablet      levothyroxine (SYNTHROID, LEVOTHROID) 25 MCG tablet      vitamin D3 (CHOLECALCIFEROL) 07072 UNITS capsule Reorder     levothyroxine (SYNTHROID, LEVOTHROID) 125 MCG tablet Reorder     rosuvastatin (CRESTOR) 20 MG tablet Reorder     folic acid (FOLVITE) 1 MG tablet Reorder     B Complex-Biotin-FA ([id number]) TABS Reorder     Calcium Carb-Cholecalciferol (CALCIUM 1000 + D) 1000-800 MG-UNIT TABS Reorder     aspirin 81 MG tablet Reorder     losartan (COZAAR) 25 MG tablet Reorder     aspirin 81 MG tablet Reorder     Calcium Carb-Cholecalciferol (CALCIUM 1000 + D) 1000-800 MG-UNIT TABS Reorder     B Complex-Biotin-FA ([id number]) TABS Reorder     folic acid (FOLVITE) 1 MG tablet Reorder     rosuvastatin (CRESTOR) 20 MG tablet Reorder     losartan (COZAAR) 25 MG tablet Reorder      aspirin 81 MG tablet Reorder     Calcium Carb-Cholecalciferol (CALCIUM 1000 + D) 1000-800 MG-UNIT TABS Reorder     B Complex-Biotin-FA ([id number]) TABS Reorder     folic acid (FOLVITE) 1 MG tablet Reorder     rosuvastatin (CRESTOR) 20 MG tablet Reorder       CURRENT MED LIST WITH CHANGES AS NOTED BELOW:  Current Outpatient Prescriptions   Medication Sig Dispense Refill     vitamin D3 (CHOLECALCIFEROL) 43595 UNITS capsule TAKE ONE CAP EVERY OTHER WEEK, FOR VITAMIN D DEFICIENCY (1ST AND 15TH OF EACH MONTH) 40 capsule PRN     levothyroxine (SYNTHROID/LEVOTHROID) 137 MCG tablet Take 1 tablet (137 mcg) by mouth daily INDICATION: THYROID SUPPLEMENTATION, TAKE FIRST THING IN THE MORNING ON AN EMPTY STOMACH,  NO FOOD FOR AN HOUR 90 tablet 3     losartan (COZAAR) 25 MG tablet Take 0.5 tablets (12.5 mg) by mouth daily 45 tablet 3     aspirin 81 MG tablet Take 1 tablet (81 mg) by mouth daily INDICATION: FOR HEART AND CARDIOVASCULAR HEALTH 100 tablet 3     Calcium Carb-Cholecalciferol (CALCIUM 1000 + D) 1000-800 MG-UNIT TABS Take 1 tablet by mouth daily TAKE WITH FOOD, FOR BONE HEALTH AND FOR VITAMIN D SUPPLEMENTATION 100 tablet PRN     B Complex-Biotin-FA ([id number]) TABS Take 1 capsule by mouth daily INDICATION: THIAMINE/VITAMIN DEFICIENCY (MUST HAVE AT LEAST 100 MG OF THIAMINE) 100 tablet PRN     folic acid (FOLVITE) 1 MG tablet Take 1 tablet (1 mg) by mouth daily INDICATION: VITAMIN SUPPLEMENTATION 100 tablet 3     rosuvastatin (CRESTOR) 20 MG tablet Take 1 tablet (20 mg) by mouth daily INDICATION: TO LOWER CHOLESTEROL AND TO HELP REDUCE RISK OF HEART ATTACK AND STROKE 90 tablet 3     [DISCONTINUED] rosuvastatin (CRESTOR) 20 MG tablet Take 1 tablet (20 mg) by mouth daily INDICATION: TO LOWER CHOLESTEROL AND TO HELP REDUCE RISK OF HEART ATTACK AND STROKE 90 tablet 3     [DISCONTINUED] losartan (COZAAR) 25 MG tablet Take 0.5 tablets (12.5 mg) by mouth daily 45 tablet 3     [DISCONTINUED] rosuvastatin (CRESTOR) 20 MG  tablet Take 1 tablet (20 mg) by mouth daily INDICATION: TO LOWER CHOLESTEROL AND TO HELP REDUCE RISK OF HEART ATTACK AND STROKE 90 tablet 3     [DISCONTINUED] losartan (COZAAR) 25 MG tablet Take 0.5 tablets (12.5 mg) by mouth daily 45 tablet 3     [DISCONTINUED] rosuvastatin (CRESTOR) 20 MG tablet Take 1 tablet (20 mg) by mouth daily INDICATION: TO LOWER CHOLESTEROL AND TO HELP REDUCE RISK OF HEART ATTACK AND STROKE 90 tablet 3     [DISCONTINUED] levothyroxine (SYNTHROID, LEVOTHROID) 125 MCG tablet Take 1 tablet (125 mcg) by mouth daily INDICATION: THYROID SUPPLEMENTATION, TAKE FIRST THING IN THE MORNING ON AN EMPTY STOMACH,  NO FOOD FOR AN HOUR 90 tablet PRN     [DISCONTINUED] levothyroxine (SYNTHROID, LEVOTHROID) 25 MCG tablet Take 1 tablet (25 mcg) by mouth daily INDICATION: THYROID SUPPLEMENTATION, TAKE FIRST THING IN THE MORNING ON AN EMPTY STOMACH, NO FOOD FOR AN HOUR, SATURDAYS AND SUNDAYS ONLY 30 tablet 3     [DISCONTINUED] vitamin D3 (CHOLECALCIFEROL) 82882 UNITS capsule TAKE ONE CAP EVERY OTHER WEEK, FOR VITAMIN D DEFICIENCY (1ST AND 15TH OF EACH MONTH) 40 capsule PRN         Patient Instructions   ** FOLLOW UP PLAN**:    PLEASE SCHEDULE FASTING LABS WITH OFFICE VISIT 12 MONTHS FROM TODAY for follow-up of coronary artery disease, and cholesterol     BE SURE TO CALL TO SCHEDULE YOUR LAB DRAW APPOINTMENT FOR AT LEAST 5 DAYS BEFORE YOUR NEXT VISIT    I WILL NO LONGER BE PRACTICING HERE AT THE [facility] AS OF NOV 7TH 2017, SO YOU WILL NEED TO ESTABLISH CARE WITH ANOTHER PRIMARY CARE PHYSICIAN AT A LOCATION OF YOUR CHOICE, OR WITH ONE OF THE PROVIDERS AT THE [facility]      YOU HAVE BEEN REFERRED FOR BONE DENSITY, COLONOSCOPY, SCREENING MAMMOGRAM SCAN   PLEASE  MAKE SURE TO SCHEDULE BONE DENSITY, SCREENING MAMMOGRAM APPOINTMENT(S) AT THE   OR BY CALLING THE NUMBER BELOW AND  COLONOSCOPY APPOINTMENT(S) BY TELEPHONE      Please also remember to have your pneumococcal vaccination administered when  you come in for your mammogram and bone density scan. Given your previous reactions of body aches and pains which I think is a minor reaction, I would recommend that you take ibuprofen or Tylenol about 2-4 hours prior to getting the shot and round the clock for the following 24-48 hours.      YOU MAY CONTACT THE CLINIC IF ANY QUESTIONS OR CONCERNS -[phone] OR VIA [facility]!             Lung Cancer Screening   Frequently Asked Questions  If you are at high-risk for lung cancer, getting screened with low-dose computed tomography (LDCT) every year can help save your life. This handout offers answers to some of the most common questions about lung cancer screening. If you have other questions, please call [phone] ([phone]).     What is it?  Lung cancer screening uses special X-ray technology to create an image of your lung tissue. The exam is quick and easy and takes less than 10 seconds. We don t give you any medicine or use any needles. You can eat before and after the exam. You don t need to change your clothes as long as the clothing on your chest doesn t contain metal. But, you do need to be able to hold your breath for at least 6 seconds during the exam.    What is the goal of lung cancer screening?  The goal of lung cancer screening is to save lives. Many times, lung cancer is not found until a person starts having physical symptoms. Lung cancer screening can help detect lung cancer in the earliest stages when it may be easier to treat.    Who should be screened for lung cancer?  We suggest lung cancer screening for anyone who is at high-risk for lung cancer. You are in the high-risk group if you:      are between the ages of 55 and 79, and    have smoked at least 1 pack of cigarettes a day for 30 or more years, and    still smoke or have quit within the past 15 years.    However, if you have a new cough or shortness of breath, you should talk to your doctor before being screened.    Some  national lung health advocacy groups also recommend screening for people ages 50 to 79 who have smoked an average of 1 pack of cigarettes a day for 20 years. They must also have at least 1 other risk factor for lung cancer, not including exposure to secondhand smoke. Other risk factors are having had cancer in the past, emphysema, pulmonary fibrosis, COPD, a family history of lung cancer, or exposure to certain materials such as arsenic, asbestos, beryllium, cadmium, chromium, diesel fumes, nickel, radon or silica. Your care team can help you know if you have one of these risk factors.     Why does it matter if I have symptoms?  Certain symptoms can be a sign that you have a condition in your lungs that should be checked and treated by your doctor. These symptoms include fever, chest pain, a new or changing cough, shortness of breath that you have never felt before, coughing up blood or unexplained weight loss. Having any of these symptoms can greatly affect the results of lung cancer screening.       Should all smokers get an LDCT lung cancer screening exam?  It depends. Lung cancer screening is for a very specific group of men and women who have a history of heavy smoking over a long period of time (see  Who should be screened for lung cancer  above).  I am in the high-risk group, but have been diagnosed with cancer in the past. Is LDCT lung cancer screening right for me?  In some cases, you should not have LDCT lung screening, such as when your doctor is already following your cancer with CT scan studies. Your doctor will help you decide if LDCT lung screening is right for you.  Do I need to have a screening exam every year?  Yes. If you are in the high-risk group described earlier, you should get an LDCT lung cancer screening exam every year until you are 79, or are no longer willing or able to undergo screening and possible procedures to diagnose and treat lung cancer.  How effective is LDCT at preventing death  from lung cancer?  Studies have shown that LDCT lung cancer screening can lower the risk of death from lung cancer by 20 percent in people who are at high-risk.  What are the risks?  There are some risks and limitations of LDCT lung cancer screening. We want to make sure you understand the risks and benefits, so please let us know if you have any questions. Your doctor may want to talk with you more about these risks.    Radiation exposure: As with any exam that uses radiation, there is a very small increased risk of cancer. The amount of radiation in LDCT is small--about the same amount a person would get from a mammogram. Your doctor orders the exam when he or she feels the potential benefits outweigh the risks.    False negatives: No test is perfect, including LDCT. It is possible that you may have a medical condition, including lung cancer, that is not found during your exam. This is called a false negative result.    False positives and more testing: LDCT very often finds something in the lung that could be cancer, but in fact is not. This is called a false positive result. False positive tests often cause anxiety. To make sure these findings are not cancer, you may need to have more tests. These tests will be done only if you give us permission. Sometimes patients need a treatment that can have side effects, such as a biopsy. For more information on false positives, see  What can I expect from the results?     Findings not related to lung cancer: Your LDCT exam also takes pictures of areas of your body next to your lungs. In a very small number of cases, the CT scan will show an abnormal finding in one of these areas, such as your kidneys, adrenal glands, liver or thyroid. This finding may not be serious, but you may need more tests. Your doctor can help you decide what other tests you may need, if any.  What can I expect from the results?  About 1 out of 4 LDCT exams will find something that may need more  tests. Most of the time, these findings are lung nodules. Lung nodules are very small collections of tissue in the lung. These nodules are very common, and the vast majority--more than 97 percent--are not cancer (benign). Most are normal lymph nodes or small areas of scarring from past infections.  But, if a small lung nodule is found to be cancer, the cancer can be cured more than 90 percent of the time. To know if the nodule is cancer, we may need to get more images before your next yearly screening exam. If the nodule has suspicious features (for example, it is large, has an odd shape or grows over time), we will refer you to a specialist for further testing.  Will my doctor also get the results?  Yes. Your doctor will get a copy of your results.  Is it okay to keep smoking now that there s a cancer screening exam?  No. Tobacco is one of the strongest cancer-causing agents. It causes not only lung cancer, but other cancers and cardiovascular (heart) diseases as well. The damage caused by smoking builds over time. This means that the longer you smoke, the higher your risk of disease. While it is never too late to quit, the sooner you quit, the better.  Where can I find help to quit smoking?  The best way to prevent lung cancer is to stop smoking. If you have already quit smoking, congratulations and keep it up! For help on quitting smoking, please call [employer] at [phone]-SQSP ([phone]) or the American [employer] Society at 1-299.799.3702 to find local resources near you.  One-on-one health coaching:  If you d prefer to work individually with a health care provider on tobacco cessation, we offer:      Medication Therapy Management:  Our specially trained pharmacists work closely with you and your doctor to help you quit smoking.  Call [phone] or [phone] (toll free).     Can Do: Health coaching offered by [facility].  [email or web address]        Bárbara Esposito MD  [facility]  [facility] Influenza Immunization Documentation    1.  Is the person to be vaccinated sick today?   No    2. Does the person to be vaccinated have an allergy to a component   of the vaccine?   No  Egg Allergy Algorithm Link    3. Has the person to be vaccinated ever had a serious reaction   to influenza vaccine in the past?   No    4. Has the person to be vaccinated ever had Guillain-Barré syndrome?   No    Form completed by MICHAEL Lui

## 2017-10-23 NOTE — MR AVS SNAPSHOT
After Visit Summary   10/23/2017    Jaymie Mares    MRN: [id number]           Patient Information     Date Of Birth          1960        Visit Information        Provider Department      10/23/2017 9:30 AM Bárbara Esposito MD [facility]        Today's Diagnoses     Coronary artery disease involving native coronary artery of native heart without angina pectoris    -  1    Hypothyroidism, unspecified type        Hyperlipidemia LDL goal <70        Vitamin deficiency        Thiamine deficiency neuropathy        Vitamin D deficiency        Past myocardial infarction        Acute anterior wall MI (H)        Need for prophylactic vaccination and inoculation against influenza        History of tobacco use        Visit for screening mammogram        Asymptomatic postmenopausal status        Special screening for malignant neoplasms, colon        Elevated serum creatinine          Care Instructions    ** FOLLOW UP PLAN**:    PLEASE SCHEDULE FASTING LABS WITH OFFICE VISIT 12 MONTHS FROM TODAY for follow-up of coronary artery disease, and cholesterol     BE SURE TO CALL TO SCHEDULE YOUR LAB DRAW APPOINTMENT FOR AT LEAST 5 DAYS BEFORE YOUR NEXT VISIT    I WILL NO LONGER BE PRACTICING HERE AT THE [facility] AS OF NOV 7TH 2017, SO YOU WILL NEED TO ESTABLISH CARE WITH ANOTHER PRIMARY CARE PHYSICIAN AT A LOCATION OF YOUR CHOICE, OR WITH ONE OF THE PROVIDERS AT THE [facility]      YOU HAVE BEEN REFERRED FOR BONE DENSITY, COLONOSCOPY, SCREENING MAMMOGRAM SCAN   PLEASE  MAKE SURE TO SCHEDULE BONE DENSITY, SCREENING MAMMOGRAM APPOINTMENT(S) AT THE   OR BY CALLING THE NUMBER BELOW AND  COLONOSCOPY APPOINTMENT(S) BY TELEPHONE      Please also remember to have your pneumococcal vaccination administered when you come in for your mammogram and bone density scan. Given your previous reactions of body aches and pains which I think is a minor reaction, I would recommend that you  take ibuprofen or Tylenol about 2-4 hours prior to getting the shot and round the clock for the following 24-48 hours.      YOU MAY CONTACT THE CLINIC IF ANY QUESTIONS OR CONCERNS -[phone] OR VIA [employer]             Lung Cancer Screening   Frequently Asked Questions  If you are at high-risk for lung cancer, getting screened with low-dose computed tomography (LDCT) every year can help save your life. This handout offers answers to some of the most common questions about lung cancer screening. If you have other questions, please call [phone] ([phone]).     What is it?  Lung cancer screening uses special X-ray technology to create an image of your lung tissue. The exam is quick and easy and takes less than 10 seconds. We don t give you any medicine or use any needles. You can eat before and after the exam. You don t need to change your clothes as long as the clothing on your chest doesn t contain metal. But, you do need to be able to hold your breath for at least 6 seconds during the exam.    What is the goal of lung cancer screening?  The goal of lung cancer screening is to save lives. Many times, lung cancer is not found until a person starts having physical symptoms. Lung cancer screening can help detect lung cancer in the earliest stages when it may be easier to treat.    Who should be screened for lung cancer?  We suggest lung cancer screening for anyone who is at high-risk for lung cancer. You are in the high-risk group if you:      are between the ages of 55 and 79, and    have smoked at least 1 pack of cigarettes a day for 30 or more years, and    still smoke or have quit within the past 15 years.    However, if you have a new cough or shortness of breath, you should talk to your doctor before being screened.    Some national lung health advocacy groups also recommend screening for people ages 50 to 79 who have smoked an average of 1 pack of cigarettes a day for 20 years. They must also  have at least 1 other risk factor for lung cancer, not including exposure to secondhand smoke. Other risk factors are having had cancer in the past, emphysema, pulmonary fibrosis, COPD, a family history of lung cancer, or exposure to certain materials such as arsenic, asbestos, beryllium, cadmium, chromium, diesel fumes, nickel, radon or silica. Your care team can help you know if you have one of these risk factors.     Why does it matter if I have symptoms?  Certain symptoms can be a sign that you have a condition in your lungs that should be checked and treated by your doctor. These symptoms include fever, chest pain, a new or changing cough, shortness of breath that you have never felt before, coughing up blood or unexplained weight loss. Having any of these symptoms can greatly affect the results of lung cancer screening.       Should all smokers get an LDCT lung cancer screening exam?  It depends. Lung cancer screening is for a very specific group of men and women who have a history of heavy smoking over a long period of time (see  Who should be screened for lung cancer  above).  I am in the high-risk group, but have been diagnosed with cancer in the past. Is LDCT lung cancer screening right for me?  In some cases, you should not have LDCT lung screening, such as when your doctor is already following your cancer with CT scan studies. Your doctor will help you decide if LDCT lung screening is right for you.  Do I need to have a screening exam every year?  Yes. If you are in the high-risk group described earlier, you should get an LDCT lung cancer screening exam every year until you are 79, or are no longer willing or able to undergo screening and possible procedures to diagnose and treat lung cancer.  How effective is LDCT at preventing death from lung cancer?  Studies have shown that LDCT lung cancer screening can lower the risk of death from lung cancer by 20 percent in people who are at high-risk.  What are  the risks?  There are some risks and limitations of LDCT lung cancer screening. We want to make sure you understand the risks and benefits, so please let us know if you have any questions. Your doctor may want to talk with you more about these risks.    Radiation exposure: As with any exam that uses radiation, there is a very small increased risk of cancer. The amount of radiation in LDCT is small--about the same amount a person would get from a mammogram. Your doctor orders the exam when he or she feels the potential benefits outweigh the risks.    False negatives: No test is perfect, including LDCT. It is possible that you may have a medical condition, including lung cancer, that is not found during your exam. This is called a false negative result.    False positives and more testing: LDCT very often finds something in the lung that could be cancer, but in fact is not. This is called a false positive result. False positive tests often cause anxiety. To make sure these findings are not cancer, you may need to have more tests. These tests will be done only if you give us permission. Sometimes patients need a treatment that can have side effects, such as a biopsy. For more information on false positives, see  What can I expect from the results?     Findings not related to lung cancer: Your LDCT exam also takes pictures of areas of your body next to your lungs. In a very small number of cases, the CT scan will show an abnormal finding in one of these areas, such as your kidneys, adrenal glands, liver or thyroid. This finding may not be serious, but you may need more tests. Your doctor can help you decide what other tests you may need, if any.  What can I expect from the results?  About 1 out of 4 LDCT exams will find something that may need more tests. Most of the time, these findings are lung nodules. Lung nodules are very small collections of tissue in the lung. These nodules are very common, and the vast  majority--more than 97 percent--are not cancer (benign). Most are normal lymph nodes or small areas of scarring from past infections.  But, if a small lung nodule is found to be cancer, the cancer can be cured more than 90 percent of the time. To know if the nodule is cancer, we may need to get more images before your next yearly screening exam. If the nodule has suspicious features (for example, it is large, has an odd shape or grows over time), we will refer you to a specialist for further testing.  Will my doctor also get the results?  Yes. Your doctor will get a copy of your results.  Is it okay to keep smoking now that there s a cancer screening exam?  No. Tobacco is one of the strongest cancer-causing agents. It causes not only lung cancer, but other cancers and cardiovascular (heart) diseases as well. The damage caused by smoking builds over time. This means that the longer you smoke, the higher your risk of disease. While it is never too late to quit, the sooner you quit, the better.  Where can I find help to quit smoking?  The best way to prevent lung cancer is to stop smoking. If you have already quit smoking, congratulations and keep it up! For help on quitting smoking, please call [place] at [phone]-BYSQ ([phone]) or the American Cancer Society at 1-708.632.9599 to find local resources near you.  One-on-one health coaching:  If you d prefer to work individually with a health care provider on tobacco cessation, we offer:      Medication Therapy Management:  Our specially trained pharmacists work closely with you and your doctor to help you quit smoking.  Call [phone] or [phone] (toll free).     Can Do: Health coaching offered by [facility].  [email or web address]            Follow-ups after your visit        Additional Services     GASTROENTEROLOGY ADULT REF PROCEDURE ONLY       Last Lab Result: Creatinine (mg/dL)       Date                     Value                 10/12/2017                1.11 (H)         ----------  Body mass index is 26.36 kg/(m^2).      Patient will be contacted to schedule procedure.     Please be aware that coverage of these services is subject to the terms and limitations of your health insurance plan.  Call member services at your health plan with any benefit or coverage questions.  Any procedures must be performed at a [facility] facility OR coordinated by your clinic's referral office.    Please bring the following with you to your appointment:    (1) Any X-Rays, CTs or MRIs which have been performed.  Contact the facility where they were done to arrange for  prior to your scheduled appointment.    (2) List of current medications   (3) This referral request   (4) Any documents/labs given to you for this referral                  Future tests that were ordered for you today     Open Future Orders        Priority Expected Expires Ordered    TSH Routine 10/23/2017 4/22/2018 10/23/2017    T4 free Routine 10/23/2017 4/22/2018 10/23/2017    DX Hip/Pelvis/Spine Routine 10/23/2017 10/22/2018 10/23/2017    MA Screening Digital Bilateral Routine  10/23/2018 10/23/2017    CT Chest Lung Cancer Scrn Low Dose wo Routine  10/23/2018 10/23/2017            Who to contact     If you have questions or need follow up information about today's clinic visit or your schedule please contact [facility] directly at [phone].  Normal or non-critical lab and imaging results will be communicated to you by MyChart, letter or phone within 4 business days after the clinic has received the results. If you do not hear from us within 7 days, please contact the clinic through MyChart or phone. If you have a critical or abnormal lab result, we will notify you by phone as soon as possible.  Submit refill requests through [employer] or call your pharmacy and they will forward the refill request to us. Please allow 3 business days for your refill to be completed.           Additional Information About Your Visit        [employer] Information     [employer] gives you secure access to your electronic health record. If you see a primary care provider, you can also send messages to your care team and make appointments. If you have questions, please call your primary care clinic.  If you do not have a primary care provider, please call [phone] and they will assist you.        Care EveryWhere ID     This is your Care EveryWhere ID. This could be used by other organizations to access your [place] medical records  [id number]        Your Vitals Were     Pulse Temperature Respirations Pulse Oximetry BMI (Body Mass Index)       85 98.7  F (37.1  C) (Oral) 18 98% 26.36 kg/m2        Blood Pressure from Last 3 Encounters:   10/23/17 110/70   05/02/17 123/79   11/03/16 122/70    Weight from Last 3 Encounters:   10/23/17 148 lb 12.8 oz (67.5 kg)   05/02/17 147 lb (66.7 kg)   11/03/16 144 lb 3.2 oz (65.4 kg)              We Performed the Following     Basic metabolic panel     FLU VAC, SPLIT VIRUS IM > 3 YO (QUADRIVALENT) [03789]     GASTROENTEROLOGY ADULT REF PROCEDURE ONLY     Prof fee: Shared Decisionmaking for Lung Cancer Screening     Vaccine Administration, Initial [92294]     Vitamin D Deficiency          Today's Medication Changes          These changes are accurate as of: 10/23/17 10:56 AM.  If you have any questions, ask your nurse or doctor.               Start taking these medicines.        Dose/Directions    aspirin 81 MG tablet   Used for:  Past myocardial infarction   Started by:  Bárbara Esposito MD        Dose:  81 mg   Take 1 tablet (81 mg) by mouth daily INDICATION: FOR HEART AND CARDIOVASCULAR HEALTH   Quantity:  100 tablet   Refills:  3       [id number] Tabs   Used for:  Thiamine deficiency neuropathy   Started by:  Bárbara Esposito MD        Dose:  1 capsule   Take 1 capsule by mouth daily INDICATION: THIAMINE/VITAMIN DEFICIENCY (MUST HAVE AT LEAST 100 MG OF  THIAMINE)   Quantity:  100 tablet   Refills:  PRN       Calcium Carb-Cholecalciferol 1000-800 MG-UNIT Tabs   Commonly known as:  CALCIUM 1000 + D   Used for:  Vitamin D deficiency   Started by:  Bárbara Esposito MD        Dose:  1 tablet   Take 1 tablet by mouth daily TAKE WITH FOOD, FOR BONE HEALTH AND FOR VITAMIN D SUPPLEMENTATION   Quantity:  100 tablet   Refills:  PRN       folic acid 1 MG tablet   Commonly known as:  FOLVITE   Used for:  Vitamin deficiency   Started by:  Bárbara Esposito MD        Dose:  1 mg   Take 1 tablet (1 mg) by mouth daily INDICATION: VITAMIN SUPPLEMENTATION   Quantity:  100 tablet   Refills:  3       losartan 25 MG tablet   Commonly known as:  COZAAR   Used for:  Acute anterior wall MI (H), Coronary artery disease involving native coronary artery of native heart without angina pectoris   Started by:  Bárbara Esposito MD        Dose:  12.5 mg   Take 0.5 tablets (12.5 mg) by mouth daily   Quantity:  45 tablet   Refills:  3       rosuvastatin 20 MG tablet   Commonly known as:  CRESTOR   Used for:  Hyperlipidemia LDL goal <70   Started by:  Bárbara Esposito MD        Dose:  20 mg   Take 1 tablet (20 mg) by mouth daily INDICATION: TO LOWER CHOLESTEROL AND TO HELP REDUCE RISK OF HEART ATTACK AND STROKE   Quantity:  90 tablet   Refills:  3         These medicines have changed or have updated prescriptions.        Dose/Directions    levothyroxine 137 MCG tablet   Commonly known as:  SYNTHROID/LEVOTHROID   This may have changed:    - medication strength  - how much to take  - Another medication with the same name was removed. Continue taking this medication, and follow the directions you see here.   Used for:  Hypothyroidism, unspecified type   Changed by:  Bárbara Esposito MD        Dose:  137 mcg   Take 1 tablet (137 mcg) by mouth daily INDICATION: THYROID SUPPLEMENTATION, TAKE FIRST THING IN THE MORNING ON AN EMPTY STOMACH,  NO FOOD FOR AN HOUR   Quantity:  90 tablet   Refills:   3         Stop taking these medicines if you haven't already. Please contact your care team if you have questions.     metoprolol 100 MG 24 hr tablet   Commonly known as:  TOPROL-XL   Stopped by:  Bárbara Esposito MD                Where to get your medicines      These medications were sent to [facility] [address]  [address] [address]     Phone:  [phone]     vitamin D3 18991 UNITS capsule         Some of these will need a paper prescription and others can be bought over the counter.  Ask your nurse if you have questions.     Bring a paper prescription for each of these medications     aspirin 81 MG tablet    [id number] Tabs    Calcium Carb-Cholecalciferol 1000-800 MG-UNIT Tabs    folic acid 1 MG tablet    levothyroxine 137 MCG tablet    losartan 25 MG tablet    rosuvastatin 20 MG tablet                Primary Care Provider Office Phone # Fax #    Bárbara Esposito [phone]       [address] [address]        Equal Access to Services     [facility] : Hadii yannick ku hadasho Soomaali, waaxda luqadaha, qaybta kaalmada adeegyada, helena landrum haytammi beaver . So [facility] [phone].    ATENCIÓN: Si habla español, tiene a campbell disposición servicios gratuitos de asistencia lingüística. Ll[facility] [phone].    We comply with applicable federal civil rights laws and [place] laws. We do not discriminate on the basis of race, color, national origin, age, disability, sex, sexual orientation, or gender identity.            Thank you!     Thank you for choosing [facility]  for your care. Our goal is always to provide you with excellent care. Hearing back from our patients is one way we can continue to improve our services. Please take a few minutes to complete the written survey that you may receive in the mail after your visit with us. Thank you!             Your Updated Medication  List - Protect others around you: Learn how to safely use, store and throw away your medicines at www.disposemymeds.org.          This list is accurate as of: 10/23/17 10:56 AM.  Always use your most recent med list.                   Brand Name Dispense Instructions for use Diagnosis    aspirin 81 MG tablet     100 tablet    Take 1 tablet (81 mg) by mouth daily INDICATION: FOR HEART AND CARDIOVASCULAR HEALTH    Past myocardial infarction       [id number] Tabs     100 tablet    Take 1 capsule by mouth daily INDICATION: THIAMINE/VITAMIN DEFICIENCY (MUST HAVE AT LEAST 100 MG OF THIAMINE)    Thiamine deficiency neuropathy       Calcium Carb-Cholecalciferol 1000-800 MG-UNIT Tabs    CALCIUM 1000 + D    100 tablet    Take 1 tablet by mouth daily TAKE WITH FOOD, FOR BONE HEALTH AND FOR VITAMIN D SUPPLEMENTATION    Vitamin D deficiency       folic acid 1 MG tablet    FOLVITE    100 tablet    Take 1 tablet (1 mg) by mouth daily INDICATION: VITAMIN SUPPLEMENTATION    Vitamin deficiency       levothyroxine 137 MCG tablet    SYNTHROID/LEVOTHROID    90 tablet    Take 1 tablet (137 mcg) by mouth daily INDICATION: THYROID SUPPLEMENTATION, TAKE FIRST THING IN THE MORNING ON AN EMPTY STOMACH,  NO FOOD FOR AN HOUR    Hypothyroidism, unspecified type       losartan 25 MG tablet    COZAAR    45 tablet    Take 0.5 tablets (12.5 mg) by mouth daily    Acute anterior wall MI (H), Coronary artery disease involving native coronary artery of native heart without angina pectoris       rosuvastatin 20 MG tablet    CRESTOR    90 tablet    Take 1 tablet (20 mg) by mouth daily INDICATION: TO LOWER CHOLESTEROL AND TO HELP REDUCE RISK OF HEART ATTACK AND STROKE    Hyperlipidemia LDL goal <70       vitamin D3 83282 UNITS capsule    CHOLECALCIFEROL    40 capsule    TAKE ONE CAP EVERY OTHER WEEK, FOR VITAMIN D DEFICIENCY (1ST AND 15TH OF EACH MONTH)    Vitamin D deficiency

## 2017-10-23 NOTE — PROGRESS NOTES
Lung Cancer Screening Shared Decision Making Visit     Jaymie Mares is eligible for lung cancer screening on the basis of the information provided in my signed lung cancer screening order.     I have discussed with patient the risks and benefits of screening for lung cancer with low-dose CT.     The risks include:   radiation exposure    false positives     over-diagnosis    The benefit of early detection of lung cancer is contingent upon adherence to annual screening or more frequent follow up if indicated.     Furthermore, reaping the benefits of screening requires Jaymie Mares to be willing and physically able to undergo diagnostic procedures, if indicated. Although no specific guide is available for determining severity of comorbidities, it is reasonable to withhold screening in patients who have greater mortality risk from other diseases.     We did not discuss that the only way to prevent lung cancer is to not smoke. Smoking cessation assistance was not offered.    I did not offer risk estimation using a calculator such as this one:    ShouldIScreen

## 2017-10-23 NOTE — PATIENT INSTRUCTIONS
** FOLLOW UP PLAN**:    PLEASE SCHEDULE FASTING LABS WITH OFFICE VISIT 12 MONTHS FROM TODAY for follow-up of coronary artery disease, and cholesterol     BE SURE TO CALL TO SCHEDULE YOUR LAB DRAW APPOINTMENT FOR AT LEAST 5 DAYS BEFORE YOUR NEXT VISIT    I WILL NO LONGER BE PRACTICING HERE AT THE [facility] AS OF NOV 7TH 2017, SO YOU WILL NEED TO ESTABLISH CARE WITH ANOTHER PRIMARY CARE PHYSICIAN AT A LOCATION OF YOUR CHOICE, OR WITH ONE OF THE PROVIDERS AT THE [facility]      YOU HAVE BEEN REFERRED FOR BONE DENSITY, COLONOSCOPY, SCREENING MAMMOGRAM SCAN   PLEASE  MAKE SURE TO SCHEDULE BONE DENSITY, SCREENING MAMMOGRAM APPOINTMENT(S) AT THE   OR BY CALLING THE NUMBER BELOW AND  COLONOSCOPY APPOINTMENT(S) BY TELEPHONE      Please also remember to have your pneumococcal vaccination administered when you come in for your mammogram and bone density scan. Given your previous reactions of body aches and pains which I think is a minor reaction, I would recommend that you take ibuprofen or Tylenol about 2-4 hours prior to getting the shot and round the clock for the following 24-48 hours.      YOU MAY CONTACT THE CLINIC IF ANY QUESTIONS OR CONCERNS [phone] OR VIA V2contact             Lung Cancer Screening   Frequently Asked Questions  If you are at high-risk for lung cancer, getting screened with low-dose computed tomography (LDCT) every year can help save your life. This handout offers answers to some of the most common questions about lung cancer screening. If you have other questions, please call [phone] ([phone]).     What is it?  Lung cancer screening uses special X-ray technology to create an image of your lung tissue. The exam is quick and easy and takes less than 10 seconds. We don t give you any medicine or use any needles. You can eat before and after the exam. You don t need to change your clothes as long as the clothing on your chest doesn t contain metal. But, you do need  to be able to hold your breath for at least 6 seconds during the exam.    What is the goal of lung cancer screening?  The goal of lung cancer screening is to save lives. Many times, lung cancer is not found until a person starts having physical symptoms. Lung cancer screening can help detect lung cancer in the earliest stages when it may be easier to treat.    Who should be screened for lung cancer?  We suggest lung cancer screening for anyone who is at high-risk for lung cancer. You are in the high-risk group if you:      are between the ages of 55 and 79, and    have smoked at least 1 pack of cigarettes a day for 30 or more years, and    still smoke or have quit within the past 15 years.    However, if you have a new cough or shortness of breath, you should talk to your doctor before being screened.    Some national lung health advocacy groups also recommend screening for people ages 50 to 79 who have smoked an average of 1 pack of cigarettes a day for 20 years. They must also have at least 1 other risk factor for lung cancer, not including exposure to secondhand smoke. Other risk factors are having had cancer in the past, emphysema, pulmonary fibrosis, COPD, a family history of lung cancer, or exposure to certain materials such as arsenic, asbestos, beryllium, cadmium, chromium, diesel fumes, nickel, radon or silica. Your care team can help you know if you have one of these risk factors.     Why does it matter if I have symptoms?  Certain symptoms can be a sign that you have a condition in your lungs that should be checked and treated by your doctor. These symptoms include fever, chest pain, a new or changing cough, shortness of breath that you have never felt before, coughing up blood or unexplained weight loss. Having any of these symptoms can greatly affect the results of lung cancer screening.       Should all smokers get an LDCT lung cancer screening exam?  It depends. Lung cancer screening is for a very  specific group of men and women who have a history of heavy smoking over a long period of time (see  Who should be screened for lung cancer  above).  I am in the high-risk group, but have been diagnosed with cancer in the past. Is LDCT lung cancer screening right for me?  In some cases, you should not have LDCT lung screening, such as when your doctor is already following your cancer with CT scan studies. Your doctor will help you decide if LDCT lung screening is right for you.  Do I need to have a screening exam every year?  Yes. If you are in the high-risk group described earlier, you should get an LDCT lung cancer screening exam every year until you are 79, or are no longer willing or able to undergo screening and possible procedures to diagnose and treat lung cancer.  How effective is LDCT at preventing death from lung cancer?  Studies have shown that LDCT lung cancer screening can lower the risk of death from lung cancer by 20 percent in people who are at high-risk.  What are the risks?  There are some risks and limitations of LDCT lung cancer screening. We want to make sure you understand the risks and benefits, so please let us know if you have any questions. Your doctor may want to talk with you more about these risks.    Radiation exposure: As with any exam that uses radiation, there is a very small increased risk of cancer. The amount of radiation in LDCT is small--about the same amount a person would get from a mammogram. Your doctor orders the exam when he or she feels the potential benefits outweigh the risks.    False negatives: No test is perfect, including LDCT. It is possible that you may have a medical condition, including lung cancer, that is not found during your exam. This is called a false negative result.    False positives and more testing: LDCT very often finds something in the lung that could be cancer, but in fact is not. This is called a false positive result. False positive tests often  cause anxiety. To make sure these findings are not cancer, you may need to have more tests. These tests will be done only if you give us permission. Sometimes patients need a treatment that can have side effects, such as a biopsy. For more information on false positives, see  What can I expect from the results?     Findings not related to lung cancer: Your LDCT exam also takes pictures of areas of your body next to your lungs. In a very small number of cases, the CT scan will show an abnormal finding in one of these areas, such as your kidneys, adrenal glands, liver or thyroid. This finding may not be serious, but you may need more tests. Your doctor can help you decide what other tests you may need, if any.  What can I expect from the results?  About 1 out of 4 LDCT exams will find something that may need more tests. Most of the time, these findings are lung nodules. Lung nodules are very small collections of tissue in the lung. These nodules are very common, and the vast majority--more than 97 percent--are not cancer (benign). Most are normal lymph nodes or small areas of scarring from past infections.  But, if a small lung nodule is found to be cancer, the cancer can be cured more than 90 percent of the time. To know if the nodule is cancer, we may need to get more images before your next yearly screening exam. If the nodule has suspicious features (for example, it is large, has an odd shape or grows over time), we will refer you to a specialist for further testing.  Will my doctor also get the results?  Yes. Your doctor will get a copy of your results.  Is it okay to keep smoking now that there s a cancer screening exam?  No. Tobacco is one of the strongest cancer-causing agents. It causes not only lung cancer, but other cancers and cardiovascular (heart) diseases as well. The damage caused by smoking builds over time. This means that the longer you smoke, the higher your risk of disease. While it is never too  late to quit, the sooner you quit, the better.  Where can I find help to quit smoking?  The best way to prevent lung cancer is to stop smoking. If you have already quit smoking, congratulations and keep it up! For help on quitting smoking, please call [employer] at 7-260-406-DIMU ([phone]) or the American Cancer Society at 1-300.617.2043 to find local resources near you.  One-on-one health coaching:  If you d prefer to work individually with a health care provider on tobacco cessation, we offer:      Medication Therapy Management:  Our specially trained pharmacists work closely with you and your doctor to help you quit smoking.  Call [phone] or [phone] (toll free).     Can Do: Health coaching offered by [facility].  [email or web address]

## 2017-10-24 LAB — DEPRECATED CALCIDIOL+CALCIFEROL SERPL-MC: 60 UG/L (ref 20–75)

## 2017-12-02 ENCOUNTER — HEALTH MAINTENANCE LETTER (OUTPATIENT)
Age: 57
End: 2017-12-02

## 2018-01-05 ENCOUNTER — TELEPHONE (OUTPATIENT)
Dept: INTERNAL MEDICINE | Facility: CLINIC | Age: 58
End: 2018-01-05

## 2018-08-29 ENCOUNTER — TELEPHONE (OUTPATIENT)
Dept: CARDIOLOGY | Facility: CLINIC | Age: 58
End: 2018-08-29

## 2018-08-29 NOTE — TELEPHONE ENCOUNTER
Called pt re: upcoming apt with Dr. Chavez. Pt has not had BMP/FLP/ALT done since Oct 2017. Pt advised we need to get labs done prior to seeing Dr. Chavez. Pt states she gets all her labs done w/ Dr. Esposito & will only get labs done through her. Pt advised to move out apt w/ Dr. Chavez until after she gets labs done with Dr. Esposito. Pt scheduled to see Dr. Esposito late October & to see Dr. Chavez 11/15/18. LPen[place] RN

## 2018-10-11 ENCOUNTER — TRANSFERRED RECORDS (OUTPATIENT)
Dept: HEALTH INFORMATION MANAGEMENT | Facility: CLINIC | Age: 58
End: 2018-10-11

## 2018-10-11 DIAGNOSIS — Z78.0 ASYMPTOMATIC POSTMENOPAUSAL STATUS: ICD-10-CM

## 2018-10-11 DIAGNOSIS — Z87.891 PERSONAL HISTORY OF TOBACCO USE: ICD-10-CM

## 2018-10-11 DIAGNOSIS — Z12.31 ENCOUNTER FOR SCREENING MAMMOGRAM FOR BREAST CANCER: Primary | ICD-10-CM

## 2018-10-11 NOTE — PROGRESS NOTES
Lung Cancer Screening Shared Decision Making Visit     Jaymie Mares is eligible for lung cancer screening on the basis of the information provided in my signed lung cancer screening order.     I have discussed with patient the risks and benefits of screening for lung cancer with low-dose CT.     The risks include:  radiation exposure: one low dose chest CT has as much ionizing radiation as about 15 chest x-rays or 6 months of background radiation living in [place]    false positives: 96% of positive findings/nodules are NOT cancer, but some might still require additional diagnostic evaluation, including biopsy  over-diagnosis: some slow growing cancers that might never have been clinically significant will be detected and treated unnecessarily }    The benefit of early detection of lung cancer is contingent upon adherence to annual screening or more frequent follow up if indicated.     Furthermore, reaping the benefits of screening requires Jaymie Mares to be willing and physically able to undergo diagnostic procedures, if indicated. Although no specific guide is available for determining severity of comorbidities, it is reasonable to withhold screening in patients who have greater mortality risk from other diseases.     We did not discuss that the only way to prevent lung cancer is to not smoke.     I did not offer risk estimation using a calculator such as this one:    ShouldIScreen

## 2018-10-11 NOTE — PATIENT INSTRUCTIONS

## 2018-10-12 LAB
ALBUMIN SERPL-MCNC: 4.6 G/DL
ALP SERPL-CCNC: 86 U/L
ALT SERPL-CCNC: 23 U/L
ANION GAP SERPL CALCULATED.3IONS-SCNC: NORMAL MMOL/L
ANION GAP SERPL CALCULATED.3IONS-SCNC: NORMAL MMOL/L
AST SERPL-CCNC: 21 U/L
BILIRUB SERPL-MCNC: 0.7 MG/DL
BUN SERPL-MCNC: 12 MG/DL
BUN SERPL-MCNC: 12 MG/DL
CALCIUM SERPL-MCNC: 9.7 MG/DL
CALCIUM SERPL-MCNC: 9.7 MG/DL
CHLORIDE SERPLBLD-SCNC: 104 MMOL/L
CHLORIDE SERPLBLD-SCNC: 104 MMOL/L
CHOLEST SERPL-MCNC: 168 MG/DL
CO2 SERPL-SCNC: 24 MMOL/L
CO2 SERPL-SCNC: 24 MMOL/L
CREAT SERPL-MCNC: 0.96 MG/DL
CREAT SERPL-MCNC: 0.96 MG/DL
ERYTHROCYTE [DISTWIDTH] IN BLOOD BY AUTOMATED COUNT: 12.9 %
GFR SERPL CREATININE-BSD FRML MDRD: 66 ML/MIN/1.73M2
GFR SERPL CREATININE-BSD FRML MDRD: 66 ML/MIN/1.73M2
GLUCOSE SERPL-MCNC: 92 MG/DL (ref 70–99)
GLUCOSE SERPL-MCNC: 92 MG/DL (ref 70–99)
HCT VFR BLD AUTO: 38.9 %
HDLC SERPL-MCNC: 90 MG/DL
HEMOGLOBIN: 12.8 G/DL
LDLC SERPL CALC-MCNC: 62 MG/DL
Lab: 2
MAGNESIUM SERPL-MCNC: 2.1 MG/DL
MCH RBC QN AUTO: 31.4 PG
MCHC RBC AUTO-ENTMCNC: 35.5 G/DL
MCV RBC AUTO: 88.4 FL
NONHDLC SERPL-MCNC: 78 MG/DL
PLATELET # BLD AUTO: 300 10^9/L
POTASSIUM SERPL-SCNC: 3.9 MMOL/L
POTASSIUM SERPL-SCNC: 3.9 MMOL/L
PROT SERPL-MCNC: 7.7 G/DL
PROT SERPL-MCNC: 7.7 G/DL
RBC # BLD AUTO: 4.4 10^12/L
SODIUM SERPL-SCNC: 138 MMOL/L
SODIUM SERPL-SCNC: 138 MMOL/L
T3, TOTAL - QUEST: 97 NG/ML (ref 60–181)
TRIGL SERPL-MCNC: 79 MG/DL
TSH SERPL-ACNC: 0.3 MCU/ML
WBC # BLD AUTO: 9.3 10^9/L

## 2018-11-15 ENCOUNTER — OFFICE VISIT (OUTPATIENT)
Dept: CARDIOLOGY | Facility: CLINIC | Age: 58
End: 2018-11-15
Attending: INTERNAL MEDICINE
Payer: COMMERCIAL

## 2018-11-15 VITALS
HEIGHT: 63 IN | SYSTOLIC BLOOD PRESSURE: 120 MMHG | DIASTOLIC BLOOD PRESSURE: 78 MMHG | HEART RATE: 92 BPM | WEIGHT: 153 LBS | BODY MASS INDEX: 27.11 KG/M2

## 2018-11-15 DIAGNOSIS — I21.09 ACUTE ANTERIOR WALL MI (H): ICD-10-CM

## 2018-11-15 DIAGNOSIS — I25.10 CORONARY ARTERY DISEASE INVOLVING NATIVE CORONARY ARTERY OF NATIVE HEART WITHOUT ANGINA PECTORIS: ICD-10-CM

## 2018-11-15 PROCEDURE — 99213 OFFICE O/P EST LOW 20 MIN: CPT | Performed by: INTERNAL MEDICINE

## 2018-11-15 RX ORDER — METOPROLOL SUCCINATE 50 MG/1
50 TABLET, EXTENDED RELEASE ORAL DAILY
Qty: 90 TABLET | Refills: 3 | Status: SHIPPED | OUTPATIENT
Start: 2018-11-15 | End: 2019-12-09

## 2018-11-15 RX ORDER — LOSARTAN POTASSIUM 25 MG/1
12.5 TABLET ORAL DAILY
Qty: 45 TABLET | Refills: 3 | Status: SHIPPED | OUTPATIENT
Start: 2018-11-15 | End: 2019-12-20

## 2018-11-15 RX ORDER — METOPROLOL TARTRATE 100 MG
100 TABLET ORAL AT BEDTIME
COMMUNITY
End: 2018-11-15

## 2018-11-15 NOTE — LETTER
11/15/2018    Bárbara Esposito MD  [facility] [address] [facility] [address]  [place] [address]    RE: Jaymie Mares       Dear Colleague,    I had the pleasure of seeing Jaymie Mares in the [facility].    HPI and Plan:   See dictation    Orders Placed This Encounter   Procedures     Basic metabolic panel     Lipid Profile     ALT     Follow-Up with Cardiologist       Orders Placed This Encounter   Medications     losartan (COZAAR) 25 MG tablet     Sig: Take 0.5 tablets (12.5 mg) by mouth daily     Dispense:  45 tablet     Refill:  3     DISCONTD: metoprolol tartrate (LOPRESSOR) 100 MG tablet     Sig: Take 100 mg by mouth At Bedtime     metoprolol succinate (TOPROL-XL) 50 MG 24 hr tablet     Sig: Take 1 tablet (50 mg) by mouth daily     Dispense:  90 tablet     Refill:  3       Medications Discontinued During This Encounter   Medication Reason     metoprolol tartrate (LOPRESSOR) 100 MG tablet      losartan (COZAAR) 25 MG tablet Reorder         Encounter Diagnoses   Name Primary?     Acute anterior wall MI (H)      Coronary artery disease involving native coronary artery of native heart without angina pectoris        CURRENT MEDICATIONS:  Current Outpatient Prescriptions   Medication Sig Dispense Refill     aspirin 81 MG tablet Take 1 tablet (81 mg) by mouth daily INDICATION: FOR HEART AND CARDIOVASCULAR HEALTH 100 tablet 3     B Complex-Biotin-FA (O-[id number]) TABS Take 1 capsule by mouth daily INDICATION: THIAMINE/VITAMIN DEFICIENCY (MUST HAVE AT LEAST 100 MG OF THIAMINE) 100 tablet PRN     Calcium Carb-Cholecalciferol (CALCIUM 1000 + D) 1000-800 MG-UNIT TABS Take 1 tablet by mouth daily TAKE WITH FOOD, FOR BONE HEALTH AND FOR VITAMIN D SUPPLEMENTATION 100 tablet PRN     folic acid (FOLVITE) 1 MG tablet Take 1 tablet (1 mg) by mouth daily INDICATION: VITAMIN SUPPLEMENTATION 100 tablet 3     levothyroxine (SYNTHROID/LEVOTHROID) 137 MCG tablet Take 1 tablet (137 mcg) by  mouth daily INDICATION: THYROID SUPPLEMENTATION, TAKE FIRST THING IN THE MORNING ON AN EMPTY STOMACH,  NO FOOD FOR AN HOUR 90 tablet 3     losartan (COZAAR) 25 MG tablet Take 0.5 tablets (12.5 mg) by mouth daily 45 tablet 3     metoprolol succinate (TOPROL-XL) 50 MG 24 hr tablet Take 1 tablet (50 mg) by mouth daily 90 tablet 3     rosuvastatin (CRESTOR) 20 MG tablet Take 1 tablet (20 mg) by mouth daily INDICATION: TO LOWER CHOLESTEROL AND TO HELP REDUCE RISK OF HEART ATTACK AND STROKE 90 tablet 3     vitamin D3 (CHOLECALCIFEROL) 53545 UNITS capsule TAKE ONE CAP EVERY OTHER WEEK, FOR VITAMIN D DEFICIENCY (1ST AND 15TH OF EACH MONTH) 40 capsule PRN     [DISCONTINUED] losartan (COZAAR) 25 MG tablet Take 0.5 tablets (12.5 mg) by mouth daily 45 tablet 3       ALLERGIES     Allergies   Allergen Reactions     Lisinopril      hypotension     Sulfa Drugs      Zithromax [Azithromycin]        PAST MEDICAL HISTORY:  Past Medical History:   Diagnosis Date     Cardiac arrest (H)     V fib cardiac arrest. February 2010     Coronary artery disease      Hyperlipidaemia      Hypothyroidism      Ischemic cardiomyopathy     EF now normalized     Myocardial infarction (H)     Anterior MI February 2010       PAST SURGICAL HISTORY:  Past Surgical History:   Procedure Laterality Date     CARDIAC CATHERIZATION       CORONARY ANGIOGRAPHY ADULT ORDER      Emergent cath 2/21/10- SUPRIYA to proximal LAD       FAMILY HISTORY:  Family History   Problem Relation Age of Onset     Cancer Father 74     LUNG     Prostate Cancer Father      GASTROINTESTINAL DISEASE Mother        SOCIAL HISTORY:  Social History     Social History     Marital status:      Spouse name: N/A     Number of children: N/A     Years of education: N/A     Social History Main Topics     Smoking status: Former Smoker     Packs/day: 0.20     Years: 15.00     Types: Cigarettes     Start date: 1980     Quit date: 2/12/2010     Smokeless tobacco: Never Used     Alcohol use Yes       "Comment: Occasional glass of wine.     Drug use: No     Sexual activity: Yes     Partners: Male     Other Topics Concern     None     Social History Narrative       Review of Systems:  Skin:  Negative       Eyes:  Positive for glasses    ENT:  Negative      Respiratory:  Negative       Cardiovascular:  Negative      Gastroenterology: Negative      Genitourinary:  Negative      Musculoskeletal:  Positive for back pain    Neurologic:  Negative      Psychiatric:  Negative      Heme/Lymph/Imm:  Positive for allergies    Endocrine:  Positive for thyroid disorder      Physical Exam:  Vitals: /78  Pulse 92  Ht 1.6 m (5' 3\")  Wt 69.4 kg (153 lb)  BMI 27.1 kg/m2    Constitutional:  cooperative, alert and oriented, well developed, well nourished, in no acute distress        Skin:  warm and dry to the touch, no apparent skin lesions or masses noted          Head:  normocephalic, no masses or lesions        Eyes:  pupils equal and round, conjunctivae and lids unremarkable, sclera white, no xanthalasma, EOMS intact, no nystagmus        Lymph:      ENT:  no pallor or cyanosis, dentition good        Neck:  carotid pulses are full and equal bilaterally, JVP normal, no carotid bruit        Respiratory:  normal breath sounds, clear to auscultation, normal A-P diameter, normal symmetry, normal respiratory excursion, no use of accessory muscles         Cardiac: regular rhythm, normal S1/S2, no S3 or S4, apical impulse not displaced, no murmurs, gallops or rubs                pulses full and equal, no bruits auscultated                                        GI:  abdomen soft, non-tender, BS normoactive, no mass, no HSM, no bruits        Extremities and Muscular Skeletal:  no deformities, clubbing, cyanosis, erythema observed;no edema              Neurological:  no gross motor deficits;affect appropriate        Psych:  Alert and Oriented x 3        CC  Hector Chavez MD  [address]  [place] " [address]                Thank you for allowing me to participate in the care of your patient.      Sincerely,     Hector Chavez MD, MD     [facility]    cc:   Hector Chavez MD  [address] [address]

## 2018-11-15 NOTE — PROGRESS NOTES
Service Date: 11/15/2018      REFERRING PHYSICIAN:  Dr. Esposito.        HISTORY OF PRESENT ILLNESS:  It is my pleasure to see your patient, Jaymie Mares, who is a pleasant 58-year-old patient with a past history of an acute anterior myocardial infarct due to a high-grade lesion in the left anterior descending artery.  This anterior MI was complicated by cardiac arrest.  The patient was resuscitated.  The patient has done well since that time with no chest pains, chest pressure or shortness of breath.  Despite the fact of having an anterior MI, her left ventricular systolic function returned to normal post-stenting.  She has no chest pains, chest pressure or shortness of breath.  Her lipid profile on 10/11 was excellent with an LDL of 62, HDL of 90 and triglycerides of 79 with a total cholesterol 168.  She is on high-intensity statin therapy, been on rosuvastatin 20 mg per day.  She did tolerate low dose losartan 12.5 mg per day.  In the past she has become hypotensive with ACE inhibitors.  Her blood pressure is normal today at 120/78.  I know that you had decided possibly to increase her dose of metoprolol and to change the preparation of metoprolol also.  The tartrate form of metoprolol only lasts for 12 hours.  Also, on the day that she had her blood pressure checked with you she had not eaten for the entire day and was under some stress.  Her blood pressure in fact is completely normal today and at home. and I would advise continuing on the metoprolol succinate form of metoprolol.      IMPRESSION:   1.  Status post anterior MI and resuscitated cardiac arrest.  The patient has no symptoms of angina pectoris and is feeling well.   2.  Normal left ventricular systolic function on echocardiography in 2015.    3.  Normotensive with a blood pressure 120/78 with a past history of hypotension with medications.   4.  Excellent lipid profile on high-intensity statin therapy.      PLAN:  I will continue the patient on her  present medications and in particular I probably would not switch to the tartrate form of Lopressor in that the tartrate form only gives 12 hours of medication whereas the succinate form gives 24 hours.  So, the metoprolol tartrate 100 mg per day would not give her full 24-hour coverage.  Her blood pressure appears to be well controlled on metoprolol succinate 50 mg per day and on her own blood pressure monitor her blood pressure is also well controlled and usually in the 110s-120s, and also remembering that this is a patient who had significant hypotension in the past with relatively modest doses of lisinopril.  I suspect with the Lopressor 100 mg she would become dizzy again.  I will see her back again in a year's time and we will repeat her lipids at that stage.  We should probably repeat her echocardiogram also.  It is my pleasure to be involved in the care of this very nice patient.      cc:   Bárbara [place] [address]         BEVERLY RUBALCAVA MD, [facility]             D: 11/15/2018   T: 11/15/2018   MT: ALBERTO      Name:     GLENDA FULTON   MRN:      [id number]        Account:      [id number]   :      1960           Service Date: 11/15/2018      Document: [id number]

## 2018-11-15 NOTE — MR AVS SNAPSHOT
After Visit Summary   11/15/2018    Jaymie Mares    MRN: [id number]           Patient Information     Date Of Birth          1960        Visit Information        Provider Department      11/15/2018 8:15 AM Hector Chavez MD [facility]        Today's Diagnoses     Acute anterior wall MI (H)        Coronary artery disease involving native coronary artery of native heart without angina pectoris           Follow-ups after your visit        Additional Services     Follow-Up with Cardiologist                 Future tests that were ordered for you today     Open Future Orders        Priority Expected Expires Ordered    Basic metabolic panel Routine 11/15/2019 11/16/2019 11/15/2018    Lipid Profile Routine 11/15/2019 11/15/2019 11/15/2018    ALT Routine 11/15/2019 11/15/2019 11/15/2018    Follow-Up with Cardiologist Routine 11/15/2019 11/16/2019 11/15/2018            Who to contact     If you have questions or need follow up information about today's clinic visit or your schedule please contact [facility] directly at [phone].  Normal or non-critical lab and imaging results will be communicated to you by Itandihart, letter or phone within 4 business days after the clinic has received the results. If you do not hear from us within 7 days, please contact the clinic through Nichewitht or phone. If you have a critical or abnormal lab result, we will notify you by phone as soon as possible.  Submit refill requests through ivWatch or call your pharmacy and they will forward the refill request to us. Please allow 3 business days for your refill to be completed.          Additional Information About Your Visit        Itandihart Information     ivWatch gives you secure access to your electronic health record. If you see a primary care provider, you can also send messages to your care team and make appointments. If you have  "questions, please call your primary care clinic.  If you do not have a primary care provider, please call [phone] and they will assist you.        Care EveryWhere ID     This is your Care EveryWhere ID. This could be used by other organizations to access your [place] medical records  [id number]        Your Vitals Were     Pulse Height BMI (Body Mass Index)             92 1.6 m (5' 3\") 27.1 kg/m2          Blood Pressure from Last 3 Encounters:   11/15/18 120/78   10/23/17 110/70   05/02/17 123/79    Weight from Last 3 Encounters:   11/15/18 69.4 kg (153 lb)   10/23/17 67.5 kg (148 lb 12.8 oz)   05/02/17 66.7 kg (147 lb)              We Performed the Following     Follow-Up with Cardiologist          Today's Medication Changes          These changes are accurate as of 11/15/18  8:29 AM.  If you have any questions, ask your nurse or doctor.               Start taking these medicines.        Dose/Directions    metoprolol succinate 50 MG 24 hr tablet   Commonly known as:  TOPROL-XL   Used for:  Acute anterior wall MI (H), Coronary artery disease involving native coronary artery of native heart without angina pectoris   Started by:  Hector Chavez MD        Dose:  50 mg   Take 1 tablet (50 mg) by mouth daily   Quantity:  90 tablet   Refills:  3         Stop taking these medicines if you haven't already. Please contact your care team if you have questions.     metoprolol tartrate 100 MG tablet   Commonly known as:  LOPRESSOR   Stopped by:  Hector Chavez MD                Where to get your medicines      These medications were sent to [facility]/pharmacy #[id number] [place], [place] - [address] [address]     Phone:  [phone]     losartan 25 MG tablet    metoprolol succinate 50 MG 24 hr tablet                Primary Care Provider Office Phone # Fax #    Bárbara Esposito [phone]       [facility] [address]  [facility] [address]        Equal Access to Services     HERNANDEZ MCMILLAN : Hadii aad ku hadlalitapardeep Solenaali, waaxda luqadaha, qaybta mariellasarmadhelena cordon. So [facility] [phone].    ATENCIÓN: Si habla español, tiene a campbell disposición servicios gratuitos de asistencia lingüística. Llame al [phone].    We comply with applicable federal civil rights laws and [place] laws. We do not discriminate on the basis of race, color, national origin, age, disability, sex, sexual orientation, or gender identity.            Thank you!     Thank you for choosing [facility]  for your care. Our goal is always to provide you with excellent care. Hearing back from our patients is one way we can continue to improve our services. Please take a few minutes to complete the written survey that you may receive in the mail after your visit with us. Thank you!             Your Updated Medication List - Protect others around you: Learn how to safely use, store and throw away your medicines at www.disposemymeds.org.          This list is accurate as of 11/15/18  8:29 AM.  Always use your most recent med list.                   Brand Name Dispense Instructions for use Diagnosis    aspirin 81 MG tablet     100 tablet    Take 1 tablet (81 mg) by mouth daily INDICATION: FOR HEART AND CARDIOVASCULAR HEALTH    Past myocardial infarction       [id number] Tabs     100 tablet    Take 1 capsule by mouth daily INDICATION: THIAMINE/VITAMIN DEFICIENCY (MUST HAVE AT LEAST 100 MG OF THIAMINE)    Thiamine deficiency neuropathy       Calcium Carb-Cholecalciferol 1000-800 MG-UNIT Tabs    CALCIUM 1000 + D    100 tablet    Take 1 tablet by mouth daily TAKE WITH FOOD, FOR BONE HEALTH AND FOR VITAMIN D SUPPLEMENTATION    Vitamin D deficiency       folic acid 1 MG tablet    FOLVITE    100 tablet    Take 1 tablet (1 mg) by mouth daily INDICATION: VITAMIN SUPPLEMENTATION    Vitamin  deficiency       levothyroxine 137 MCG tablet    SYNTHROID/LEVOTHROID    90 tablet    Take 1 tablet (137 mcg) by mouth daily INDICATION: THYROID SUPPLEMENTATION, TAKE FIRST THING IN THE MORNING ON AN EMPTY STOMACH,  NO FOOD FOR AN HOUR    Hypothyroidism, unspecified type       losartan 25 MG tablet    COZAAR    45 tablet    Take 0.5 tablets (12.5 mg) by mouth daily    Acute anterior wall MI (H), Coronary artery disease involving native coronary artery of native heart without angina pectoris       metoprolol succinate 50 MG 24 hr tablet    TOPROL-XL    90 tablet    Take 1 tablet (50 mg) by mouth daily    Acute anterior wall MI (H), Coronary artery disease involving native coronary artery of native heart without angina pectoris       rosuvastatin 20 MG tablet    CRESTOR    90 tablet    Take 1 tablet (20 mg) by mouth daily INDICATION: TO LOWER CHOLESTEROL AND TO HELP REDUCE RISK OF HEART ATTACK AND STROKE    Hyperlipidemia LDL goal <70       vitamin D3 08776 UNITS capsule    CHOLECALCIFEROL    40 capsule    TAKE ONE CAP EVERY OTHER WEEK, FOR VITAMIN D DEFICIENCY (1ST AND 15TH OF EACH MONTH)    Vitamin D deficiency

## 2018-11-15 NOTE — LETTER
11/15/2018      Bárbara Esposito MD  [facility] [address] [facility]   [facility] [address]      RE: Jaymie Mares       Dear Colleague,    I had the pleasure of seeing Jaymie Mares in the [facility].    Service Date: 11/15/2018      REFERRING PHYSICIAN:  Dr. Esposito.        HISTORY OF PRESENT ILLNESS:  It is my pleasure to see your patient, Jaymie Mares, who is a pleasant 58-year-old patient with a past history of an acute anterior myocardial infarct due to a high-grade lesion in the left anterior descending artery.  This anterior MI was complicated by cardiac arrest.  The patient was resuscitated.  The patient has done well since that time with no chest pains, chest pressure or shortness of breath.  Despite the fact of having an anterior MI, her left ventricular systolic function returned to normal post-stenting.  She has no chest pains, chest pressure or shortness of breath.  Her lipid profile on 10/11 was excellent with an LDL of 62, HDL of 90 and triglycerides of 79 with a total cholesterol 168.  She is on high-intensity statin therapy, been on rosuvastatin 20 mg per day.  She did tolerate low dose losartan 12.5 mg per day.  In the past she has become hypotensive with ACE inhibitors.  Her blood pressure is normal today at 120/78.  I know that you had decided possibly to increase her dose of metoprolol and to change the preparation of metoprolol also.  The tartrate form of metoprolol only lasts for 12 hours.  Also, on the day that she had her blood pressure checked with you she had not eaten for the entire day and was under some stress.  Her blood pressure in fact is completely normal today and at home. and I would advise continuing on the metoprolol succinate form of metoprolol.      IMPRESSION:   1.  Status post anterior MI and resuscitated cardiac arrest.  The patient has no symptoms of angina pectoris and is feeling well.   2.  Normal left ventricular systolic  function on echocardiography in .    3.  Normotensive with a blood pressure 120/78 with a past history of hypotension with medications.   4.  Excellent lipid profile on high-intensity statin therapy.      PLAN:  I will continue the patient on her present medications and in particular I probably would not switch to the tartrate form of Lopressor in that the tartrate form only gives 12 hours of medication whereas the succinate form gives 24 hours.  So, the metoprolol tartrate 100 mg per day would not give her full 24-hour coverage.  Her blood pressure appears to be well controlled on metoprolol succinate 50 mg per day and on her own blood pressure monitor her blood pressure is also well controlled and usually in the 110s-120s, and also remembering that this is a patient who had significant hypotension in the past with relatively modest doses of lisinopril.  I suspect with the Lopressor 100 mg she would become dizzy again.  I will see her back again in a year's time and we will repeat her lipids at that stage.  We should probably repeat her echocardiogram also.  It is my pleasure to be involved in the care of this very nice patient.      cc:   [place]ár[place] [address]         BEVERLY RUBALCAVA MD, [facility]             D: 11/15/2018   T: 11/15/2018   MT: ALBERTO      Name:     GLENDA FULTON   MRN:      [id number]        Account:      [id number]   :      1960           Service Date: 11/15/2018      Document: [id number]           Outpatient Encounter Prescriptions as of 11/15/2018   Medication Sig Dispense Refill     aspirin 81 MG tablet Take 1 tablet (81 mg) by mouth daily INDICATION: FOR HEART AND CARDIOVASCULAR HEALTH 100 tablet 3     B Complex-Biotin-FA ([id number]) TABS Take 1 capsule by mouth daily INDICATION: THIAMINE/VITAMIN DEFICIENCY (MUST HAVE AT LEAST 100 MG OF THIAMINE) 100 tablet PRN     Calcium Carb-Cholecalciferol  (CALCIUM 1000 + D) 1000-800 MG-UNIT TABS Take 1 tablet by mouth daily TAKE WITH FOOD, FOR BONE HEALTH AND FOR VITAMIN D SUPPLEMENTATION 100 tablet PRN     folic acid (FOLVITE) 1 MG tablet Take 1 tablet (1 mg) by mouth daily INDICATION: VITAMIN SUPPLEMENTATION 100 tablet 3     levothyroxine (SYNTHROID/LEVOTHROID) 137 MCG tablet Take 1 tablet (137 mcg) by mouth daily INDICATION: THYROID SUPPLEMENTATION, TAKE FIRST THING IN THE MORNING ON AN EMPTY STOMACH,  NO FOOD FOR AN HOUR 90 tablet 3     losartan (COZAAR) 25 MG tablet Take 0.5 tablets (12.5 mg) by mouth daily 45 tablet 3     metoprolol succinate (TOPROL-XL) 50 MG 24 hr tablet Take 1 tablet (50 mg) by mouth daily 90 tablet 3     rosuvastatin (CRESTOR) 20 MG tablet Take 1 tablet (20 mg) by mouth daily INDICATION: TO LOWER CHOLESTEROL AND TO HELP REDUCE RISK OF HEART ATTACK AND STROKE 90 tablet 3     vitamin D3 (CHOLECALCIFEROL) 95801 UNITS capsule TAKE ONE CAP EVERY OTHER WEEK, FOR VITAMIN D DEFICIENCY (1ST AND 15TH OF EACH MONTH) 40 capsule PRN     [DISCONTINUED] losartan (COZAAR) 25 MG tablet Take 0.5 tablets (12.5 mg) by mouth daily 45 tablet 3     [DISCONTINUED] metoprolol tartrate (LOPRESSOR) 100 MG tablet Take 100 mg by mouth At Bedtime       No facility-administered encounter medications on file as of 11/15/2018.        Again, thank you for allowing me to participate in the care of your patient.      Sincerely,    Hector Chavez MD, MD     [facility]

## 2018-11-15 NOTE — PROGRESS NOTES
HPI and Plan:   See dictation    Orders Placed This Encounter   Procedures     Basic metabolic panel     Lipid Profile     ALT     Follow-Up with Cardiologist       Orders Placed This Encounter   Medications     losartan (COZAAR) 25 MG tablet     Sig: Take 0.5 tablets (12.5 mg) by mouth daily     Dispense:  45 tablet     Refill:  3     DISCONTD: metoprolol tartrate (LOPRESSOR) 100 MG tablet     Sig: Take 100 mg by mouth At Bedtime     metoprolol succinate (TOPROL-XL) 50 MG 24 hr tablet     Sig: Take 1 tablet (50 mg) by mouth daily     Dispense:  90 tablet     Refill:  3       Medications Discontinued During This Encounter   Medication Reason     metoprolol tartrate (LOPRESSOR) 100 MG tablet      losartan (COZAAR) 25 MG tablet Reorder         Encounter Diagnoses   Name Primary?     Acute anterior wall MI (H)      Coronary artery disease involving native coronary artery of native heart without angina pectoris        CURRENT MEDICATIONS:  Current Outpatient Prescriptions   Medication Sig Dispense Refill     aspirin 81 MG tablet Take 1 tablet (81 mg) by mouth daily INDICATION: FOR HEART AND CARDIOVASCULAR HEALTH 100 tablet 3     B Complex-Biotin-FA ([id number]) TABS Take 1 capsule by mouth daily INDICATION: THIAMINE/VITAMIN DEFICIENCY (MUST HAVE AT LEAST 100 MG OF THIAMINE) 100 tablet PRN     Calcium Carb-Cholecalciferol (CALCIUM 1000 + D) 1000-800 MG-UNIT TABS Take 1 tablet by mouth daily TAKE WITH FOOD, FOR BONE HEALTH AND FOR VITAMIN D SUPPLEMENTATION 100 tablet PRN     folic acid (FOLVITE) 1 MG tablet Take 1 tablet (1 mg) by mouth daily INDICATION: VITAMIN SUPPLEMENTATION 100 tablet 3     levothyroxine (SYNTHROID/LEVOTHROID) 137 MCG tablet Take 1 tablet (137 mcg) by mouth daily INDICATION: THYROID SUPPLEMENTATION, TAKE FIRST THING IN THE MORNING ON AN EMPTY STOMACH,  NO FOOD FOR AN HOUR 90 tablet 3     losartan (COZAAR) 25 MG tablet Take 0.5 tablets (12.5 mg) by mouth daily 45 tablet 3     metoprolol succinate  (TOPROL-XL) 50 MG 24 hr tablet Take 1 tablet (50 mg) by mouth daily 90 tablet 3     rosuvastatin (CRESTOR) 20 MG tablet Take 1 tablet (20 mg) by mouth daily INDICATION: TO LOWER CHOLESTEROL AND TO HELP REDUCE RISK OF HEART ATTACK AND STROKE 90 tablet 3     vitamin D3 (CHOLECALCIFEROL) 21429 UNITS capsule TAKE ONE CAP EVERY OTHER WEEK, FOR VITAMIN D DEFICIENCY (1ST AND 15TH OF EACH MONTH) 40 capsule PRN     [DISCONTINUED] losartan (COZAAR) 25 MG tablet Take 0.5 tablets (12.5 mg) by mouth daily 45 tablet 3       ALLERGIES     Allergies   Allergen Reactions     Lisinopril      hypotension     Sulfa Drugs      Zithromax [Azithromycin]        PAST MEDICAL HISTORY:  Past Medical History:   Diagnosis Date     Cardiac arrest (H)     V fib cardiac arrest. February 2010     Coronary artery disease      Hyperlipidaemia      Hypothyroidism      Ischemic cardiomyopathy     EF now normalized     Myocardial infarction (H)     Anterior MI February 2010       PAST SURGICAL HISTORY:  Past Surgical History:   Procedure Laterality Date     CARDIAC CATHERIZATION       CORONARY ANGIOGRAPHY ADULT ORDER      Emergent cath 2/21/10- SUPRIYA to proximal LAD       FAMILY HISTORY:  Family History   Problem Relation Age of Onset     Cancer Father 74     LUNG     Prostate Cancer Father      GASTROINTESTINAL DISEASE Mother        SOCIAL HISTORY:  Social History     Social History     Marital status:      Spouse name: N/A     Number of children: N/A     Years of education: N/A     Social History Main Topics     Smoking status: Former Smoker     Packs/day: 0.20     Years: 15.00     Types: Cigarettes     Start date: 1980     Quit date: 2/12/2010     Smokeless tobacco: Never Used     Alcohol use Yes      Comment: Occasional glass of wine.     Drug use: No     Sexual activity: Yes     Partners: Male     Other Topics Concern     None     Social History Narrative       Review of Systems:  Skin:  Negative       Eyes:  Positive for glasses    ENT:   "Negative      Respiratory:  Negative       Cardiovascular:  Negative      Gastroenterology: Negative      Genitourinary:  Negative      Musculoskeletal:  Positive for back pain    Neurologic:  Negative      Psychiatric:  Negative      Heme/Lymph/Imm:  Positive for allergies    Endocrine:  Positive for thyroid disorder      Physical Exam:  Vitals: /78  Pulse 92  Ht 1.6 m (5' 3\")  Wt 69.4 kg (153 lb)  BMI 27.1 kg/m2    Constitutional:  cooperative, alert and oriented, well developed, well nourished, in no acute distress        Skin:  warm and dry to the touch, no apparent skin lesions or masses noted          Head:  normocephalic, no masses or lesions        Eyes:  pupils equal and round, conjunctivae and lids unremarkable, sclera white, no xanthalasma, EOMS intact, no nystagmus        Lymph:      ENT:  no pallor or cyanosis, dentition good        Neck:  carotid pulses are full and equal bilaterally, JVP normal, no carotid bruit        Respiratory:  normal breath sounds, clear to auscultation, normal A-P diameter, normal symmetry, normal respiratory excursion, no use of accessory muscles         Cardiac: regular rhythm, normal S1/S2, no S3 or S4, apical impulse not displaced, no murmurs, gallops or rubs                pulses full and equal, no bruits auscultated                                        GI:  abdomen soft, non-tender, BS normoactive, no mass, no HSM, no bruits        Extremities and Muscular Skeletal:  no deformities, clubbing, cyanosis, erythema observed;no edema              Neurological:  no gross motor deficits;affect appropriate        Psych:  Alert and Oriented x 3        CC  Hector Chavez MD  [address]  [place] [address]              "

## 2019-02-15 ENCOUNTER — HEALTH MAINTENANCE LETTER (OUTPATIENT)
Age: 59
End: 2019-02-15

## 2019-02-18 ENCOUNTER — OFFICE VISIT (OUTPATIENT)
Dept: URGENT CARE | Facility: URGENT CARE | Age: 59
End: 2019-02-18
Payer: COMMERCIAL

## 2019-02-18 VITALS
WEIGHT: 153.3 LBS | HEIGHT: 64 IN | OXYGEN SATURATION: 95 % | SYSTOLIC BLOOD PRESSURE: 130 MMHG | RESPIRATION RATE: 20 BRPM | HEART RATE: 83 BPM | DIASTOLIC BLOOD PRESSURE: 80 MMHG | BODY MASS INDEX: 26.17 KG/M2 | TEMPERATURE: 98.3 F

## 2019-02-18 DIAGNOSIS — R50.9 FEVER CHILLS: ICD-10-CM

## 2019-02-18 DIAGNOSIS — J98.01 ACUTE BRONCHOSPASM: ICD-10-CM

## 2019-02-18 DIAGNOSIS — R05.8 PRODUCTIVE COUGH: ICD-10-CM

## 2019-02-18 DIAGNOSIS — R09.89 CHEST CONGESTION: Primary | ICD-10-CM

## 2019-02-18 PROCEDURE — 99214 OFFICE O/P EST MOD 30 MIN: CPT | Performed by: PHYSICIAN ASSISTANT

## 2019-02-18 RX ORDER — ALBUTEROL SULFATE 90 UG/1
2 AEROSOL, METERED RESPIRATORY (INHALATION) EVERY 6 HOURS
Qty: 1 INHALER | Refills: 0 | Status: SHIPPED | OUTPATIENT
Start: 2019-02-18 | End: 2020-08-11

## 2019-02-18 RX ORDER — CODEINE PHOSPHATE AND GUAIFENESIN 10; 100 MG/5ML; MG/5ML
1-2 SOLUTION ORAL
Qty: 118 ML | Refills: 0 | Status: SHIPPED | OUTPATIENT
Start: 2019-02-18 | End: 2019-08-17

## 2019-02-18 ASSESSMENT — PAIN SCALES - GENERAL: PAINLEVEL: NO PAIN (0)

## 2019-02-18 ASSESSMENT — MIFFLIN-ST. JEOR: SCORE: 1262.42

## 2019-02-18 NOTE — PROGRESS NOTES
SUBJECTIVE:   Jaymie Mares is a 58 year old female presenting with a chief complaint of coughing, chest congestion, productive cough and bronchospasms.  Onset of symptoms was 2 week(s) ago.  Course of illness is same.    Severity moderate  Current and Associated symptoms: coughing, chest congestion, bronchospasms  Treatment measures tried include OTC Cough med.  Predisposing factors include recent illness.    Past Medical History:   Diagnosis Date     Cardiac arrest (H)     V fib cardiac arrest. 2010     Coronary artery disease      Hyperlipidaemia      Hypothyroidism      Ischemic cardiomyopathy     EF now normalized     Myocardial infarction (H)     Anterior MI 2010        Allergies   Allergen Reactions     Lisinopril      hypotension     Sulfa Drugs      Zithromax [Azithromycin]      Family History   Problem Relation Age of Onset     Cancer Father 74        LUNG     Prostate Cancer Father      Gastrointestinal Disease Mother          Social History     Tobacco Use     Smoking status: Former Smoker     Packs/day: 0.20     Years: 15.00     Pack years: 3.00     Types: Cigarettes     Start date:      Last attempt to quit: 2010     Years since quittin.0     Smokeless tobacco: Never Used   Substance Use Topics     Alcohol use: Yes     Comment: Occasional glass of wine.       ROS:  CONSTITUTIONAL:NEGATIVE for fever, chills, change in weight  INTEGUMENTARY/SKIN: NEGATIVE for worrisome rashes, moles or lesions  EYES: NEGATIVE for vision changes or irritation  ENT/MOUTH: POSITIVE for sinus congestion  RESP:POSITIVE for cough-productive and bronchospasms  CV: NEGATIVE for chest pain, palpitations or peripheral edema  GI: NEGATIVE for nausea, abdominal pain, heartburn, or change in bowel habits  MUSCULOSKELETAL: NEGATIVE for significant arthralgias or myalgia  NEURO: NEGATIVE for weakness, dizziness or paresthesias    OBJECTIVE  :/80 (BP Location: Left arm, Patient Position: Sitting,  "Cuff Size: Adult Regular)   Pulse 83   Temp 98.3  F (36.8  C) (Oral)   Resp 20   Ht 1.613 m (5' 3.5\")   Wt 69.5 kg (153 lb 4.8 oz)   LMP  (LMP Unknown)   SpO2 95%   BMI 26.73 kg/m    GENERAL APPEARANCE: healthy, alert and no distress  EYES: EOMI,  PERRL, conjunctiva clear  HENT: TM's normal bilaterally and nasal turbinates erythematous, swollen  NECK: supple, nontender, no lymphadenopathy  RESP: Positive for bronchospasms  CV: regular rates and rhythm, normal S1 S2, no murmur noted  ABDOMEN:  soft, nontender, no HSM or masses and bowel sounds normal  NEURO: Normal strength and tone, sensory exam grossly normal,  normal speech and mentation  SKIN: no suspicious lesions or rashes    ASSESSMENT/PLAN      ICD-10-CM    1. Chest congestion R09.89 guaiFENesin-codeine (ROBITUSSIN AC) 100-10 MG/5ML solution   2. Productive cough R05 amoxicillin-clavulanate (AUGMENTIN) 875-125 MG tablet   3. Fever chills R50.9 amoxicillin-clavulanate (AUGMENTIN) 875-125 MG tablet   4. Acute bronchospasm J98.01 albuterol (PROVENTIL HFA) 108 (90 Base) MCG/ACT inhaler         albuterol (PROVENTIL HFA) 108 (90 Base) MCG/ACT inhaler     guaiFENesin-codeine (ROBITUSSIN AC) 100-10 MG/5ML solution     amoxicillin-clavulanate (AUGMENTIN) 875-125 MG tablet       proventil for bronchospasms   Robitussin ac for coughing  Tylenol for fever  Follow up with PCP as needed  See orders in Epic    "

## 2019-10-01 ENCOUNTER — HEALTH MAINTENANCE LETTER (OUTPATIENT)
Age: 59
End: 2019-10-01

## 2019-10-22 ENCOUNTER — APPOINTMENT (OUTPATIENT)
Dept: CT IMAGING | Facility: CLINIC | Age: 59
End: 2019-10-22
Attending: EMERGENCY MEDICINE
Payer: COMMERCIAL

## 2019-10-22 ENCOUNTER — HOSPITAL ENCOUNTER (EMERGENCY)
Facility: CLINIC | Age: 59
Discharge: HOME | End: 2019-10-22
Attending: EMERGENCY MEDICINE | Admitting: EMERGENCY MEDICINE
Payer: COMMERCIAL

## 2019-10-22 VITALS
OXYGEN SATURATION: 97 % | SYSTOLIC BLOOD PRESSURE: 129 MMHG | RESPIRATION RATE: 16 BRPM | BODY MASS INDEX: 25.69 KG/M2 | TEMPERATURE: 98.8 F | HEART RATE: 69 BPM | WEIGHT: 145 LBS | HEIGHT: 63 IN | DIASTOLIC BLOOD PRESSURE: 87 MMHG

## 2019-10-22 DIAGNOSIS — R10.9 LEFT SIDED ABDOMINAL PAIN: ICD-10-CM

## 2019-10-22 LAB
ALBUMIN SERPL-MCNC: 3.3 G/DL (ref 3.4–5)
ALBUMIN UR-MCNC: NEGATIVE MG/DL
ALP SERPL-CCNC: 82 U/L (ref 40–150)
ALT SERPL W P-5'-P-CCNC: 28 U/L (ref 0–50)
ANION GAP SERPL CALCULATED.3IONS-SCNC: 4 MMOL/L (ref 3–14)
APPEARANCE UR: CLEAR
AST SERPL W P-5'-P-CCNC: 19 U/L (ref 0–45)
BACTERIA #/AREA URNS HPF: ABNORMAL /HPF
BASOPHILS # BLD AUTO: 0 10E9/L (ref 0–0.2)
BASOPHILS NFR BLD AUTO: 0.2 %
BILIRUB SERPL-MCNC: 0.5 MG/DL (ref 0.2–1.3)
BILIRUB UR QL STRIP: NEGATIVE
BUN SERPL-MCNC: 15 MG/DL (ref 7–30)
CALCIUM SERPL-MCNC: 8.4 MG/DL (ref 8.5–10.1)
CHLORIDE SERPL-SCNC: 108 MMOL/L (ref 94–109)
CO2 SERPL-SCNC: 24 MMOL/L (ref 20–32)
COLOR UR AUTO: YELLOW
CREAT BLD-MCNC: 1 MG/DL (ref 0.52–1.04)
CREAT SERPL-MCNC: 1.03 MG/DL (ref 0.52–1.04)
DIFFERENTIAL METHOD BLD: NORMAL
EOSINOPHIL # BLD AUTO: 0.1 10E9/L (ref 0–0.7)
EOSINOPHIL NFR BLD AUTO: 1.3 %
ERYTHROCYTE [DISTWIDTH] IN BLOOD BY AUTOMATED COUNT: 14 % (ref 10–15)
GFR SERPL CREATININE-BSD FRML MDRD: 57 ML/MIN/{1.73_M2}
GFR SERPL CREATININE-BSD FRML MDRD: 60 ML/MIN/{1.73_M2}
GLUCOSE SERPL-MCNC: 99 MG/DL (ref 70–99)
GLUCOSE UR STRIP-MCNC: NEGATIVE MG/DL
HCT VFR BLD AUTO: 43.2 % (ref 35–47)
HGB BLD-MCNC: 14.5 G/DL (ref 11.7–15.7)
HGB UR QL STRIP: NEGATIVE
IMM GRANULOCYTES # BLD: 0 10E9/L (ref 0–0.4)
IMM GRANULOCYTES NFR BLD: 0.2 %
KETONES UR STRIP-MCNC: NEGATIVE MG/DL
LEUKOCYTE ESTERASE UR QL STRIP: NEGATIVE
LIPASE SERPL-CCNC: 99 U/L (ref 73–393)
LYMPHOCYTES # BLD AUTO: 2.7 10E9/L (ref 0.8–5.3)
LYMPHOCYTES NFR BLD AUTO: 32 %
MCH RBC QN AUTO: 31.7 PG (ref 26.5–33)
MCHC RBC AUTO-ENTMCNC: 33.6 G/DL (ref 31.5–36.5)
MCV RBC AUTO: 95 FL (ref 78–100)
MONOCYTES # BLD AUTO: 0.9 10E9/L (ref 0–1.3)
MONOCYTES NFR BLD AUTO: 10.5 %
MUCOUS THREADS #/AREA URNS LPF: PRESENT /LPF
NEUTROPHILS # BLD AUTO: 4.7 10E9/L (ref 1.6–8.3)
NEUTROPHILS NFR BLD AUTO: 55.8 %
NITRATE UR QL: NEGATIVE
NRBC # BLD AUTO: 0 10*3/UL
NRBC BLD AUTO-RTO: 0 /100
PH UR STRIP: 7 PH (ref 5–7)
PLATELET # BLD AUTO: 173 10E9/L (ref 150–450)
POTASSIUM SERPL-SCNC: 3.9 MMOL/L (ref 3.4–5.3)
PROT SERPL-MCNC: 7.2 G/DL (ref 6.8–8.8)
RBC # BLD AUTO: 4.57 10E12/L (ref 3.8–5.2)
RBC #/AREA URNS AUTO: 0 /HPF (ref 0–2)
SODIUM SERPL-SCNC: 136 MMOL/L (ref 133–144)
SOURCE: ABNORMAL
SP GR UR STRIP: 1 (ref 1–1.03)
SQUAMOUS #/AREA URNS AUTO: 15 /HPF (ref 0–1)
UROBILINOGEN UR STRIP-MCNC: NORMAL MG/DL (ref 0–2)
WBC # BLD AUTO: 8.5 10E9/L (ref 4–11)
WBC #/AREA URNS AUTO: 0 /HPF (ref 0–5)

## 2019-10-22 PROCEDURE — 81001 URINALYSIS AUTO W/SCOPE: CPT | Performed by: EMERGENCY MEDICINE

## 2019-10-22 PROCEDURE — 25000125 ZZHC RX 250: Performed by: EMERGENCY MEDICINE

## 2019-10-22 PROCEDURE — 85025 COMPLETE CBC W/AUTO DIFF WBC: CPT | Performed by: EMERGENCY MEDICINE

## 2019-10-22 PROCEDURE — 80053 COMPREHEN METABOLIC PANEL: CPT | Performed by: EMERGENCY MEDICINE

## 2019-10-22 PROCEDURE — 99285 EMERGENCY DEPT VISIT HI MDM: CPT | Mod: 25

## 2019-10-22 PROCEDURE — 74177 CT ABD & PELVIS W/CONTRAST: CPT

## 2019-10-22 PROCEDURE — 25000128 H RX IP 250 OP 636: Performed by: EMERGENCY MEDICINE

## 2019-10-22 PROCEDURE — 83690 ASSAY OF LIPASE: CPT | Performed by: EMERGENCY MEDICINE

## 2019-10-22 PROCEDURE — 82565 ASSAY OF CREATININE: CPT | Mod: 91

## 2019-10-22 RX ORDER — IOPAMIDOL 755 MG/ML
74 INJECTION, SOLUTION INTRAVASCULAR ONCE
Status: COMPLETED | OUTPATIENT
Start: 2019-10-22 | End: 2019-10-22

## 2019-10-22 RX ADMIN — SODIUM CHLORIDE 62 ML: 9 INJECTION, SOLUTION INTRAVENOUS at 07:09

## 2019-10-22 RX ADMIN — IOPAMIDOL 74 ML: 755 INJECTION, SOLUTION INTRAVENOUS at 07:09

## 2019-10-22 ASSESSMENT — ENCOUNTER SYMPTOMS
ABDOMINAL PAIN: 1
DYSURIA: 0
FEVER: 0
VOMITING: 0
CONSTIPATION: 0

## 2019-10-22 ASSESSMENT — MIFFLIN-ST. JEOR: SCORE: 1211.85

## 2019-10-22 NOTE — ED PROVIDER NOTES
History     Chief Complaint:  Abdominal Pain      HPI   Jaymie Mares is a 59 year old female with a history of cardiac arrest, hypertension, hyperlipidemia, and ischemic cardiomyopathy who presents with abdominal pain. Patient reports that her left lower quadrant abdominal pain began suddenly four days ago when she bent over while sitting on the ground. The pain continued, but she tried to ignore it. Yesterday and today, however, when she stretched in bed she had a sharp pain in the same spot. Her  had to help her put her shoes on this morning because it hurt to bend over. Last bowel movement was yesterday afternoon and normal for her. Denies fever, chest pain, dysuria, nausea, and emesis. She has not had a colonoscopy yet, and has no history of diverticulitis. In terms of previous abdominal pains, she had ureter surgery as a child but has no other history of abdominal surgeries.      Allergies:  Lisinopril  Sulfa Drugs  Zithromax [Azithromycin]      Medications:    Albuterol   ASA 325mg   Levothyroxine   Losartan   Metoprolol succinate   Rosuvastatin      Past Medical History:    Cardiac arrest  Coronary artery disease    Hyperlipidemia    Hypothyroidism   Ischemic cardiomyopathy   Menorrhagia  Iron deficiency   Hypertension     Past Surgical History:    Cardiac catheterization  Coronary angiography adult order     Family History:    Father - lung cancer, prostate cancer  Mother - gastrointestinal disease     Social History:  The patient was accompanied to the ED by her .  Smoking Status: Former smoker 0.2 PPD for 15 years  Smokeless Tobacco: Never  Alcohol Use: yes   Marital Status:        Review of Systems   Constitutional: Negative for fever.   Cardiovascular: Negative for chest pain.   Gastrointestinal: Positive for abdominal pain. Negative for constipation and vomiting.   Genitourinary: Negative for dysuria.   All other systems reviewed and are negative.      Physical Exam  "    Patient Vitals for the past 24 hrs:   BP Temp Temp src Pulse Resp SpO2 Height Weight   10/22/19 0646 129/87 -- -- 69 16 97 % -- --   10/22/19 0623 (!) 144/85 98.8  F (37.1  C) Oral 72 14 96 % 1.6 m (5' 3\") 65.8 kg (145 lb)      Physical Exam  General: Nontoxic-appearing woman sitting upright in room 1,  at bedside  HENT: mucous membranes moist   CV: regular rate  Resp: clear throughout, normal effort, no crackles or wheezing  GI: abdomen soft,  mild left-sided abdominal tenderness without discrete masses palpable, no guarding, bowel sounds present  MSK: no bony tenderness, no CVAT  Skin: appropriately warm and dry, no abd rash  Neuro: alert, clear speech, oriented   Psych: very cheerful, cooperative, pleasant    Emergency Department Course     Imaging:  Radiology findings were communicated with the patient and family who voiced understanding of the findings.    CT Abdomen Pelvis w Contrast  IMPRESSION: No acute process demonstrated within the abdomen and  pelvis.  Report per radiology     Laboratory:  Laboratory findings were communicated with the patient and family who voiced understanding of the findings.    CBC: AWNL. (WBC 8.5, HGB 14.5, )   CMP: GFR Estimate 60 (L), Calcium 8.4 (L), Albumin 3.3 (L) o/w WNL (Creatinine 1.03)    Lipase: 99  Creatinine POCT (0643): GFR Estimate 57 (L) o/w WNL      UA: Yellow, clear urine. Bacteria urine few, squamous epithelial/HPF urine 15 (H), mucous urine present o/w WNL      Emergency Department Course:    0632: I performed an exam of the patient as documented above.     IV was inserted and blood was drawn for laboratory testing, results above.  The patient provided a urine sample here in the emergency department. This was sent for laboratory testing, findings above.  The patient was sent for a CT Abdomen Pelvis w Contrast while in the emergency department, results above.     The patient was placed on continuous cardiac and pulse ox monitoring.  Past medical " records, nursing notes, and vitals reviewed.    0819: Patient rechecked and updated.      Findings and plan explained to the Patient and spouse. Patient discharged home with instructions regarding supportive care, medications, and reasons to return. The importance of close follow-up was reviewed.     I personally reviewed the laboratory and imaging results with the Patient and spouse and answered all related questions prior to discharge.      Impression & Plan     Medical Decision Making:  Based on her history, exam, and test results as above, I think it is most likely that her presenting discomfort is from abdominal wall muscle strain, having considered a variety of alternate diagnoses including diverticulitis, UTI, ureteral stone causing renal colic, hernia, bowel obstruction, internal hemorrhage and many others.  She declined any further treatments and was reassured by the test results.  She and her  are very eager with the plan for discharge home and outpatient follow-up if not improving in the next few days, returning here in the unexpected event of acute worsening.        This record was created at least in part using electronic voice recognition software, so please excuse any typographical errors.    Discharge Diagnosis:    ICD-10-CM    1. Left sided abdominal pain R10.9 Comprehensive metabolic panel       Disposition:  Discharged to home.      Scribe Disclosure:  I, Jaimie Magallon, am serving as a scribe at 6:33 AM on 10/22/2019 to document services personally performed by Reginald Huizar MD based on my observations and the provider's statements to me.    Jaimie Magallon  10/22/2019    EMERGENCY DEPARTMENT       Reginald Huizar MD  10/22/19 1297

## 2019-10-22 NOTE — ED AVS SNAPSHOT
Emergency Department  [address] [place] [address]  Phone:  [phone]  Fax:  [phone]                                    Jaymie Mares   MRN: [id number]    Department:   Emergency Department   Date of Visit:  10/22/2019           After Visit Summary Signature Page    I have received my discharge instructions, and my questions have been answered. I have discussed any challenges I see with this plan with the nurse or doctor.    ..........................................................................................................................................  Patient/Patient Representative Signature      ..........................................................................................................................................  Patient Representative Print Name and Relationship to Patient    ..................................................               ................................................  Date                                   Time    ..........................................................................................................................................  Reviewed by Signature/Title    ...................................................              ..............................................  Date                                               Time          22EPIC Rev 08/18

## 2019-10-22 NOTE — ED TRIAGE NOTES
Patient states left lower abdominal pain, started on Sat.  Bothers when she bends over.  Dull pain.  Or when she lifts leg up to put shoe on it hurts.

## 2019-10-29 ENCOUNTER — HEALTH MAINTENANCE LETTER (OUTPATIENT)
Age: 59
End: 2019-10-29

## 2019-12-09 DIAGNOSIS — I21.09 ACUTE ANTERIOR WALL MI (H): ICD-10-CM

## 2019-12-09 DIAGNOSIS — I25.10 CORONARY ARTERY DISEASE INVOLVING NATIVE CORONARY ARTERY OF NATIVE HEART WITHOUT ANGINA PECTORIS: ICD-10-CM

## 2019-12-09 RX ORDER — METOPROLOL SUCCINATE 50 MG/1
50 TABLET, EXTENDED RELEASE ORAL DAILY
Qty: 90 TABLET | Refills: 0 | Status: SHIPPED | OUTPATIENT
Start: 2019-12-09 | End: 2020-04-06

## 2019-12-15 ENCOUNTER — HEALTH MAINTENANCE LETTER (OUTPATIENT)
Age: 59
End: 2019-12-15

## 2019-12-20 DIAGNOSIS — I21.09 ACUTE ANTERIOR WALL MI (H): ICD-10-CM

## 2019-12-20 DIAGNOSIS — I25.10 CORONARY ARTERY DISEASE INVOLVING NATIVE CORONARY ARTERY OF NATIVE HEART WITHOUT ANGINA PECTORIS: ICD-10-CM

## 2019-12-20 RX ORDER — LOSARTAN POTASSIUM 25 MG/1
12.5 TABLET ORAL DAILY
Qty: 45 TABLET | Refills: 0 | Status: SHIPPED | OUTPATIENT
Start: 2019-12-20 | End: 2020-04-20

## 2020-02-12 ENCOUNTER — DOCUMENTATION ONLY (OUTPATIENT)
Dept: CARDIOLOGY | Facility: CLINIC | Age: 60
End: 2020-02-12

## 2020-02-12 NOTE — LETTER
February 12, 2020       TO: Jaymie Mares  [address]  [facility] [address]       Dear Jaymie Mares,    We are reviewing our outstanding orders.    Dr. Chavez has ordered an office visit and lab work for an annual follow up. Your last visit was in November, 2018. Dr. Chavez will have some openings in May; scheduling for these spots should open in the next week.    Please contact the scheduling desk at [phone] to arranged for an appointment.     If you have any questions, please call the nurse phone @ [phone]. If you had your lab work done at another facility, please give us a call so we can locate the results.     Thank you  Team 5 RNs

## 2020-04-06 DIAGNOSIS — I21.09 ACUTE ANTERIOR WALL MI (H): ICD-10-CM

## 2020-04-06 DIAGNOSIS — I25.10 CORONARY ARTERY DISEASE INVOLVING NATIVE CORONARY ARTERY OF NATIVE HEART WITHOUT ANGINA PECTORIS: ICD-10-CM

## 2020-04-06 RX ORDER — METOPROLOL SUCCINATE 50 MG/1
50 TABLET, EXTENDED RELEASE ORAL DAILY
Qty: 90 TABLET | Refills: 1 | Status: SHIPPED | OUTPATIENT
Start: 2020-04-06 | End: 2020-09-29

## 2020-04-20 DIAGNOSIS — I25.10 CORONARY ARTERY DISEASE INVOLVING NATIVE CORONARY ARTERY OF NATIVE HEART WITHOUT ANGINA PECTORIS: ICD-10-CM

## 2020-04-20 RX ORDER — LOSARTAN POTASSIUM 25 MG/1
12.5 TABLET ORAL DAILY
Qty: 45 TABLET | Refills: 0 | Status: SHIPPED | OUTPATIENT
Start: 2020-04-20 | End: 2020-08-11

## 2020-04-23 ENCOUNTER — CARE COORDINATION (OUTPATIENT)
Dept: CARDIOLOGY | Facility: CLINIC | Age: 60
End: 2020-04-23

## 2020-04-23 NOTE — PROGRESS NOTES
Received faxed request for an alternative for losartan 12.5 mg daily as it is not available. Pharmacy states the only grace available is olmesartan. Senthil Chavez to review. Michelle WARE

## 2020-06-09 DIAGNOSIS — I25.10 CORONARY ARTERY DISEASE INVOLVING NATIVE CORONARY ARTERY OF NATIVE HEART WITHOUT ANGINA PECTORIS: Primary | ICD-10-CM

## 2020-06-09 DIAGNOSIS — I10 ESSENTIAL HYPERTENSION: ICD-10-CM

## 2020-06-09 DIAGNOSIS — I25.5 ISCHEMIC CARDIOMYOPATHY: ICD-10-CM

## 2020-06-09 DIAGNOSIS — E78.5 HYPERLIPIDEMIA LDL GOAL <70: ICD-10-CM

## 2020-07-30 ENCOUNTER — TELEPHONE (OUTPATIENT)
Dept: LAB | Facility: CLINIC | Age: 60
End: 2020-07-30

## 2020-07-30 NOTE — TELEPHONE ENCOUNTER

## 2020-07-31 ENCOUNTER — TELEPHONE (OUTPATIENT)
Dept: CARDIOLOGY | Facility: CLINIC | Age: 60
End: 2020-07-31

## 2020-07-31 ENCOUNTER — TELEPHONE (OUTPATIENT)
Dept: LAB | Facility: CLINIC | Age: 60
End: 2020-07-31

## 2020-07-31 NOTE — TELEPHONE ENCOUNTER
PATIENT WELLNESS TELEPHONE SCREENING     Step 1 Screening Questions    In the past 3 weeks, have you been exposed to someone with a suspected or known illness?  COVID-19? No  Chickenpox? No   Measles? No  Pertussis? No    In the past 2 weeks, have you had any of the following symptoms?   Fever/Chills? No   Cough? No   Shortness of breath? No   New loss of taste or smell? No  Sore throat? No  Muscle or body aches? No  Headaches? No  Fatigue? No  Vomiting or diarrhea? No    Step 2 Screening Results (Skip if the patient is negative for symptoms)    If the patient is positive for new or worsening symptoms, contact the ordering provider to determine if the procedure is deemed necessary. Determine if patient can be re-scheduled when the patient is symptom free or has a negative COVID test.     If ordering provider deems the procedure is necessary, notify your manager/supervisor. Provide the patient with the procedural department phone number and inform the patient to call the procedural department upon arrival.  The patient will be registered over the phone.    Step 3 Review Visitor Policy  Patient informed of the updated visitor policy   1 visitor allowed per patient   Visitor must screen negative for COVID symptoms   Visitor must wear a mask    Anthony Rubio

## 2020-08-03 ENCOUNTER — HOSPITAL ENCOUNTER (OUTPATIENT)
Dept: CARDIOLOGY | Facility: CLINIC | Age: 60
Discharge: HOME | End: 2020-08-03
Attending: INTERNAL MEDICINE | Admitting: INTERNAL MEDICINE
Payer: COMMERCIAL

## 2020-08-03 DIAGNOSIS — I10 ESSENTIAL HYPERTENSION: ICD-10-CM

## 2020-08-03 DIAGNOSIS — I25.10 CORONARY ARTERY DISEASE INVOLVING NATIVE CORONARY ARTERY OF NATIVE HEART WITHOUT ANGINA PECTORIS: ICD-10-CM

## 2020-08-03 DIAGNOSIS — E78.5 HYPERLIPIDEMIA LDL GOAL <70: ICD-10-CM

## 2020-08-03 DIAGNOSIS — I25.5 ISCHEMIC CARDIOMYOPATHY: ICD-10-CM

## 2020-08-03 LAB
ALT SERPL W P-5'-P-CCNC: <5 U/L (ref 5–30)
ANION GAP SERPL CALCULATED.3IONS-SCNC: 12.3 MMOL/L (ref 6–17)
BUN SERPL-MCNC: 20 MG/DL (ref 7–30)
CALCIUM SERPL-MCNC: 9.7 MG/DL (ref 8.5–10.5)
CHLORIDE SERPL-SCNC: 108 MMOL/L (ref 98–107)
CHOLEST SERPL-MCNC: 163 MG/DL
CO2 SERPL-SCNC: 24 MMOL/L (ref 23–29)
CREAT SERPL-MCNC: 1.17 MG/DL (ref 0.7–1.3)
GFR SERPL CREATININE-BSD FRML MDRD: 48 ML/MIN/{1.73_M2}
GLUCOSE SERPL-MCNC: 112 MG/DL (ref 70–105)
HDLC SERPL-MCNC: 85 MG/DL
LDLC SERPL CALC-MCNC: 53 MG/DL
NONHDLC SERPL-MCNC: 78 MG/DL
POTASSIUM SERPL-SCNC: 4.3 MMOL/L (ref 3.5–5.1)
SODIUM SERPL-SCNC: 140 MMOL/L (ref 136–145)
TRIGL SERPL-MCNC: 127 MG/DL

## 2020-08-03 PROCEDURE — 36415 COLL VENOUS BLD VENIPUNCTURE: CPT | Performed by: INTERNAL MEDICINE

## 2020-08-03 PROCEDURE — 93306 TTE W/DOPPLER COMPLETE: CPT

## 2020-08-03 PROCEDURE — 84460 ALANINE AMINO (ALT) (SGPT): CPT | Performed by: INTERNAL MEDICINE

## 2020-08-03 PROCEDURE — 80048 BASIC METABOLIC PNL TOTAL CA: CPT | Performed by: INTERNAL MEDICINE

## 2020-08-03 PROCEDURE — 80061 LIPID PANEL: CPT | Performed by: INTERNAL MEDICINE

## 2020-08-03 PROCEDURE — 93306 TTE W/DOPPLER COMPLETE: CPT | Mod: 26 | Performed by: INTERNAL MEDICINE

## 2020-08-11 ENCOUNTER — VIRTUAL VISIT (OUTPATIENT)
Dept: CARDIOLOGY | Facility: CLINIC | Age: 60
End: 2020-08-11
Payer: COMMERCIAL

## 2020-08-11 DIAGNOSIS — I25.5 ISCHEMIC CARDIOMYOPATHY: ICD-10-CM

## 2020-08-11 DIAGNOSIS — I10 ESSENTIAL HYPERTENSION: ICD-10-CM

## 2020-08-11 DIAGNOSIS — I21.09 ACUTE ANTERIOR WALL MI (H): ICD-10-CM

## 2020-08-11 DIAGNOSIS — E78.5 HYPERLIPIDEMIA LDL GOAL <70: ICD-10-CM

## 2020-08-11 DIAGNOSIS — I25.10 CORONARY ARTERY DISEASE INVOLVING NATIVE CORONARY ARTERY OF NATIVE HEART WITHOUT ANGINA PECTORIS: Primary | ICD-10-CM

## 2020-08-11 PROCEDURE — 99213 OFFICE O/P EST LOW 20 MIN: CPT | Mod: GT | Performed by: INTERNAL MEDICINE

## 2020-08-11 RX ORDER — LOSARTAN POTASSIUM 25 MG/1
12.5 TABLET ORAL DAILY
Qty: 45 TABLET | Refills: 3 | Status: SHIPPED | OUTPATIENT
Start: 2020-08-11 | End: 2021-10-29

## 2020-08-11 NOTE — PROGRESS NOTES
"Jaymie Mares is a 59 year old female who is being evaluated via a billable video visit.      The patient has been notified of following:     \"This video visit will be conducted via a call between you and your physician/provider. We have found that certain health care needs can be provided without the need for an in-person physical exam.  This service lets us provide the care you need with a video conversation.  If a prescription is necessary we can send it directly to your pharmacy.  If lab work is needed we can place an order for that and you can then stop by our lab to have the test done at a later time.    Video visits are billed at different rates depending on your insurance coverage.  Please reach out to your insurance provider with any questions.    If during the course of the call the physician/provider feels a video visit is not appropriate, you will not be charged for this service.\"    Patient has given verbal consent for Video visit? Yes  How would you like to obtain your AVS? MyChart  If you are dropped from the video visit, the video invite should be resent to: Text to cell phone: [phone]  Will anyone else be joining your video visit? No      Vitals - Patient Reported  Systolic (Patient Reported): 117  Diastolic (Patient Reported): 77  Weight (Patient Reported): 65.8 kg (145 lb)  Height (Patient Reported): 160 cm (5' 3\")  BMI (Based on Pt Reported Ht/Wt): 25.69      Review Of Systems:  Skin: NEGATIVE  Eyes:Ears/Nose/Throat: NEGATIVE  Respiratory: NEGATIVE  Cardiovascular:NEGATIVE  Gastrointestinal: NEGATIVE  Genitourinary:NEGATIVE  Musculoskeletal: NEGATIVE  Neurologic: NEGATIVE  Psychiatric: NEGATIVE  Hematologic/Lymphatic/Immunologic: NEGATIVE  Endocrine:  Hypothyroid    SUKH Silva    Video-Visit Details  General:  no apparent distress, normal body habitus, sitting upright.  ENT/Mouth:  membranes moist, no nasal discharge.  Normal head shape, no apparent injury or laceration.  Eyes:  no " scleral icterus, normal conjunctivae.  No observed jaundice.  Neck:  no apparent neck swelling.   Chest/Lungs:  No breathing difficulty while speaking.  No audible wheezing.  No cough during conversation.  Cardiovascular:  No obviously elevated jugular venous pressure.  No apparent edema bilaterally in LE.   Abdomen:  no obvious abdominal distention.   Extremities:  no apparent cyanosis.  Skin:  no xanthelasma.  No facial lacerations.  Neurologic:  Normal arm motion bilateral, no tremors.    Psychiatric:  Alert and oriented x3, calm demeanor    The rest of the comprehensive physical examination is deferred due to public health emergency video visit restrictions.    Type of service:  Video Visit    Video Start Time: 0828  Video End Time: 0848    Originating Location (pt. Location): Home    Distant Location (provider location):  [facility]     Platform used for Video Visit: Taylor Chavez MD, [facility]

## 2020-08-11 NOTE — LETTER
8/11/2020    Bárbara Esposito MD  [facility] [address] [facility]   [facility] [address]    RE: Jaymie Mares       Dear Colleague,    I had the pleasure of seeing Jaymie Mares in the [facility].    Jaymie Mares is a 59 year old female who is being evaluated via a billable video visit.      Service Date: 08/11/2020      VIDEO FOLLOWUP VISIT      CARDIOLOGY CONSULTATION      HISTORY OF PRESENT ILLNESS:  It is my pleasure to see your patient, Jaymie Mares in followup.  This is a 59-year-old patient who previously was followed by Dr. Ricardo Amador.  In summary, this is a patient who suffered an acute anterior myocardial infarct complicated by cardiac arrest and resuscitation.  She underwent stenting of the left anterior descending artery.  Fortunately, her ejection fraction returned to normal.  Most recent echocardiogram which was performed 1 week ago shows that her ejection fraction is normal with an EF of 55%-60%.  Diastolic function is normal.  No regional wall motion abnormalities are seen.  She has no significant valvular heart disease.      The patient is not complaining of any chest pains, chest pressure or unusual shortness of breath.  Her lipid profile was excellent a week ago with an LDL of 53, HDL of 85 and triglycerides of 127 with a total cholesterol of 163.      The patient does have a history of essential hypertension.  She tells me her blood pressure last night was 117/77.  The patient has run out of low-dose losartan.  She takes 12.5 mg per day and has not taken it for a month.  Her basic metabolic profile was abnormal a week ago.  Her creatinine is 1.17, BUN is 20 and her GFR is 48.  In late 10/2019, her GFR was 60, her creatinine was 1.03 and her BUN was 15.  It is unclear what the cause of this is because the only medication that could affect her kidney function is losartan and she was not taking that medication at the time that her basic  metabolic profile was drawn.      Her liver function tests are normal with an ALT of less than 5, indicating no hepatic toxicity from her rosuvastatin 20 mg per day.      IMPRESSION:   1.  Coronary artery disease and status post anterior myocardial infarct complicated by cardiac arrest and stenting of the left anterior descending artery.  The patient is asymptomatic with respect to coronary artery disease and her ejection fraction is normal with no evidence of a regional wall motion abnormality.   2.  Excellent lipid profile with no evidence of hepatic dysfunction from rosuvastatin 20 mg per day.   3.  Essential hypertension.  Her blood pressure appears to be well controlled.  However, the patient has not been taking losartan for the last month.   4.  Renal insufficiency.  Her GFR is reduced to 48, as I described above.  The patient was not taking losartan when this basic metabolic profile was drawn.      PLAN:   1.  We will restart the losartan 12.5 mg per day.  This is an important medication for a patient with a past history of a myocardial infarct and a patient with coronary artery disease.   2.  I have asked the patient to follow up with you with respect to renal dysfunction and for further workup of this.   3.  I will have the patient return to see us in 1 year's time, and I will repeat her echocardiogram at that stage to follow her left ventricular function status post anterior myocardial infarct.  We will also repeat her lipids with liver function tests at that time.   4.  Finally, I failed to mention that I did advise the patient to drink a lot of fluids and she does admit to the fact that she does go for quite a few hours without drinking any water.      As always, it has been my pleasure to be involved in the care of your patient.  Please call me if any questions.     Thank you for allowing me to participate in the care of your patient.    Sincerely,     Hector Chavez MD, MD     [facility]  [facility]

## 2020-08-11 NOTE — PROGRESS NOTES
Service Date: 08/11/2020      VIDEO FOLLOWUP VISIT      CARDIOLOGY CONSULTATION      HISTORY OF PRESENT ILLNESS:  It is my pleasure to see your patient, Jaymie Mares in followup.  This is a 59-year-old patient who previously was followed by Dr. Ricardo Amador.  In summary, this is a patient who suffered an acute anterior myocardial infarct complicated by cardiac arrest and resuscitation.  She underwent stenting of the left anterior descending artery.  Fortunately, her ejection fraction returned to normal.  Most recent echocardiogram which was performed 1 week ago shows that her ejection fraction is normal with an EF of 55%-60%.  Diastolic function is normal.  No regional wall motion abnormalities are seen.  She has no significant valvular heart disease.      The patient is not complaining of any chest pains, chest pressure or unusual shortness of breath.  Her lipid profile was excellent a week ago with an LDL of 53, HDL of 85 and triglycerides of 127 with a total cholesterol of 163.      The patient does have a history of essential hypertension.  She tells me her blood pressure last night was 117/77.  The patient has run out of low-dose losartan.  She takes 12.5 mg per day and has not taken it for a month.  Her basic metabolic profile was abnormal a week ago.  Her creatinine is 1.17, BUN is 20 and her GFR is 48.  In late 10/2019, her GFR was 60, her creatinine was 1.03 and her BUN was 15.  It is unclear what the cause of this is because the only medication that could affect her kidney function is losartan and she was not taking that medication at the time that her basic metabolic profile was drawn.      Her liver function tests are normal with an ALT of less than 5, indicating no hepatic toxicity from her rosuvastatin 20 mg per day.      IMPRESSION:   1.  Coronary artery disease and status post anterior myocardial infarct complicated by cardiac arrest and stenting of the left anterior descending artery.  The  patient is asymptomatic with respect to coronary artery disease and her ejection fraction is normal with no evidence of a regional wall motion abnormality.   2.  Excellent lipid profile with no evidence of hepatic dysfunction from rosuvastatin 20 mg per day.   3.  Essential hypertension.  Her blood pressure appears to be well controlled.  However, the patient has not been taking losartan for the last month.   4.  Renal insufficiency.  Her GFR is reduced to 48, as I described above.  The patient was not taking losartan when this basic metabolic profile was drawn.      PLAN:   1.  We will restart the losartan 12.5 mg per day.  This is an important medication for a patient with a past history of a myocardial infarct and a patient with coronary artery disease.   2.  I have asked the patient to follow up with you with respect to renal dysfunction and for further workup of this.   3.  I will have the patient return to see us in 1 year's time, and I will repeat her echocardiogram at that stage to follow her left ventricular function status post anterior myocardial infarct.  We will also repeat her lipids with liver function tests at that time.   4.  Finally, I failed to mention that I did advise the patient to drink a lot of fluids and she does admit to the fact that she does go for quite a few hours without drinking any water.      As always, it has been my pleasure to be involved in the care of your patient.  Please call me if any questions.      cc:   Bárbara Esposito MD   [facility]   [address] [facility] [address] [place] [facility]", [address]  [address]         BEVERLY RUBALCAVA MD, [facility]             D: 2020   T: 2020   MT: goran      Name:     GLENDA FULTON   MRN:      [id number]        Account:      [id number]   :      1960           Service Date: 2020      Document: [id number]

## 2020-09-29 DIAGNOSIS — I21.09 ACUTE ANTERIOR WALL MI (H): ICD-10-CM

## 2020-09-29 DIAGNOSIS — I25.10 CORONARY ARTERY DISEASE INVOLVING NATIVE CORONARY ARTERY OF NATIVE HEART WITHOUT ANGINA PECTORIS: ICD-10-CM

## 2020-09-29 RX ORDER — METOPROLOL SUCCINATE 50 MG/1
50 TABLET, EXTENDED RELEASE ORAL DAILY
Qty: 90 TABLET | Refills: 3 | Status: SHIPPED | OUTPATIENT
Start: 2020-09-29 | End: 2021-12-20

## 2021-01-15 ENCOUNTER — HEALTH MAINTENANCE LETTER (OUTPATIENT)
Age: 61
End: 2021-01-15

## 2021-06-14 DIAGNOSIS — I25.5 ISCHEMIC CARDIOMYOPATHY: ICD-10-CM

## 2021-06-14 DIAGNOSIS — I10 ESSENTIAL HYPERTENSION: ICD-10-CM

## 2021-06-14 DIAGNOSIS — E78.5 HYPERLIPIDEMIA LDL GOAL <70: ICD-10-CM

## 2021-06-14 DIAGNOSIS — I21.09 ACUTE ANTERIOR WALL MI (H): Primary | ICD-10-CM

## 2021-06-14 DIAGNOSIS — I25.10 CORONARY ARTERY DISEASE INVOLVING NATIVE CORONARY ARTERY OF NATIVE HEART WITHOUT ANGINA PECTORIS: ICD-10-CM

## 2021-06-15 DIAGNOSIS — E78.5 HYPERLIPIDEMIA LDL GOAL <70: ICD-10-CM

## 2021-06-15 RX ORDER — ROSUVASTATIN CALCIUM 20 MG/1
20 TABLET, COATED ORAL DAILY
Qty: 90 TABLET | Refills: 1 | Status: SHIPPED | OUTPATIENT
Start: 2021-06-15 | End: 2021-12-06

## 2021-10-24 ENCOUNTER — HEALTH MAINTENANCE LETTER (OUTPATIENT)
Age: 61
End: 2021-10-24

## 2021-10-29 DIAGNOSIS — I25.10 CORONARY ARTERY DISEASE INVOLVING NATIVE CORONARY ARTERY OF NATIVE HEART WITHOUT ANGINA PECTORIS: ICD-10-CM

## 2021-10-29 RX ORDER — LOSARTAN POTASSIUM 25 MG/1
12.5 TABLET ORAL DAILY
Qty: 45 TABLET | Refills: 0 | Status: SHIPPED | OUTPATIENT
Start: 2021-10-29 | End: 2022-01-25

## 2021-12-06 DIAGNOSIS — E78.5 HYPERLIPIDEMIA LDL GOAL <70: ICD-10-CM

## 2021-12-06 RX ORDER — ROSUVASTATIN CALCIUM 20 MG/1
20 TABLET, COATED ORAL DAILY
Qty: 90 TABLET | Refills: 0 | Status: SHIPPED | OUTPATIENT
Start: 2021-12-06 | End: 2022-03-09

## 2021-12-09 ENCOUNTER — MYC MEDICAL ADVICE (OUTPATIENT)
Dept: CARDIOLOGY | Facility: CLINIC | Age: 61
End: 2021-12-09
Payer: COMMERCIAL

## 2021-12-09 NOTE — TELEPHONE ENCOUNTER
Called pt & advised her call scheduling at [phone]. Pt states she will call to schedule appt tomorrow. Michelle WARE

## 2021-12-20 DIAGNOSIS — I25.10 CORONARY ARTERY DISEASE INVOLVING NATIVE CORONARY ARTERY OF NATIVE HEART WITHOUT ANGINA PECTORIS: ICD-10-CM

## 2021-12-20 DIAGNOSIS — I21.09 ACUTE ANTERIOR WALL MI (H): ICD-10-CM

## 2021-12-20 RX ORDER — METOPROLOL SUCCINATE 50 MG/1
50 TABLET, EXTENDED RELEASE ORAL DAILY
Qty: 90 TABLET | Refills: 0 | Status: SHIPPED | OUTPATIENT
Start: 2021-12-20 | End: 2022-07-14

## 2022-01-25 DIAGNOSIS — I25.10 CORONARY ARTERY DISEASE INVOLVING NATIVE CORONARY ARTERY OF NATIVE HEART WITHOUT ANGINA PECTORIS: ICD-10-CM

## 2022-01-25 RX ORDER — LOSARTAN POTASSIUM 25 MG/1
12.5 TABLET ORAL DAILY
Qty: 45 TABLET | Refills: 0 | Status: SHIPPED | OUTPATIENT
Start: 2022-01-25 | End: 2022-04-12

## 2022-02-08 ENCOUNTER — LAB (OUTPATIENT)
Dept: LAB | Facility: CLINIC | Age: 62
End: 2022-02-08
Payer: COMMERCIAL

## 2022-02-08 ENCOUNTER — HOSPITAL ENCOUNTER (OUTPATIENT)
Dept: CARDIOLOGY | Facility: CLINIC | Age: 62
Discharge: HOME | End: 2022-02-08
Attending: INTERNAL MEDICINE | Admitting: INTERNAL MEDICINE
Payer: COMMERCIAL

## 2022-02-08 DIAGNOSIS — I10 ESSENTIAL HYPERTENSION: ICD-10-CM

## 2022-02-08 DIAGNOSIS — I21.09 ACUTE ANTERIOR WALL MI (H): ICD-10-CM

## 2022-02-08 DIAGNOSIS — E78.5 HYPERLIPIDEMIA LDL GOAL <70: ICD-10-CM

## 2022-02-08 DIAGNOSIS — I25.5 ISCHEMIC CARDIOMYOPATHY: ICD-10-CM

## 2022-02-08 DIAGNOSIS — I25.10 CORONARY ARTERY DISEASE INVOLVING NATIVE CORONARY ARTERY OF NATIVE HEART WITHOUT ANGINA PECTORIS: ICD-10-CM

## 2022-02-08 LAB
ALT SERPL W P-5'-P-CCNC: 25 U/L (ref 0–50)
ANION GAP SERPL CALCULATED.3IONS-SCNC: 5 MMOL/L (ref 3–14)
BUN SERPL-MCNC: 15 MG/DL (ref 7–30)
CALCIUM SERPL-MCNC: 9.6 MG/DL (ref 8.5–10.1)
CHLORIDE BLD-SCNC: 109 MMOL/L (ref 94–109)
CHOLEST SERPL-MCNC: 140 MG/DL
CO2 SERPL-SCNC: 27 MMOL/L (ref 20–32)
CREAT SERPL-MCNC: 1.07 MG/DL (ref 0.52–1.04)
FASTING STATUS PATIENT QL REPORTED: YES
GFR SERPL CREATININE-BSD FRML MDRD: 60 ML/MIN/1.73M2
GLUCOSE BLD-MCNC: 109 MG/DL (ref 70–99)
HDLC SERPL-MCNC: 116 MG/DL
LDLC SERPL CALC-MCNC: <5 MG/DL
LVEF ECHO: NORMAL
NONHDLC SERPL-MCNC: 24 MG/DL
POTASSIUM BLD-SCNC: 4.1 MMOL/L (ref 3.4–5.3)
SODIUM SERPL-SCNC: 141 MMOL/L (ref 133–144)
TRIGL SERPL-MCNC: 122 MG/DL

## 2022-02-08 PROCEDURE — 80061 LIPID PANEL: CPT | Performed by: INTERNAL MEDICINE

## 2022-02-08 PROCEDURE — 36415 COLL VENOUS BLD VENIPUNCTURE: CPT | Performed by: INTERNAL MEDICINE

## 2022-02-08 PROCEDURE — 84460 ALANINE AMINO (ALT) (SGPT): CPT | Performed by: INTERNAL MEDICINE

## 2022-02-08 PROCEDURE — 93306 TTE W/DOPPLER COMPLETE: CPT | Mod: 26 | Performed by: INTERNAL MEDICINE

## 2022-02-08 PROCEDURE — 80048 BASIC METABOLIC PNL TOTAL CA: CPT | Performed by: INTERNAL MEDICINE

## 2022-02-08 PROCEDURE — 93306 TTE W/DOPPLER COMPLETE: CPT

## 2022-02-13 ENCOUNTER — HEALTH MAINTENANCE LETTER (OUTPATIENT)
Age: 62
End: 2022-02-13

## 2022-03-09 ENCOUNTER — OFFICE VISIT (OUTPATIENT)
Dept: CARDIOLOGY | Facility: CLINIC | Age: 62
End: 2022-03-09
Payer: COMMERCIAL

## 2022-03-09 VITALS
SYSTOLIC BLOOD PRESSURE: 130 MMHG | HEIGHT: 64 IN | DIASTOLIC BLOOD PRESSURE: 79 MMHG | HEART RATE: 83 BPM | OXYGEN SATURATION: 97 % | BODY MASS INDEX: 27.98 KG/M2 | WEIGHT: 163.9 LBS

## 2022-03-09 DIAGNOSIS — I25.5 ISCHEMIC CARDIOMYOPATHY: ICD-10-CM

## 2022-03-09 DIAGNOSIS — I25.10 CORONARY ARTERY DISEASE INVOLVING NATIVE CORONARY ARTERY OF NATIVE HEART WITHOUT ANGINA PECTORIS: ICD-10-CM

## 2022-03-09 DIAGNOSIS — I21.09 ACUTE ANTERIOR WALL MI (H): Primary | ICD-10-CM

## 2022-03-09 DIAGNOSIS — E78.5 HYPERLIPIDEMIA LDL GOAL <70: ICD-10-CM

## 2022-03-09 PROCEDURE — 99214 OFFICE O/P EST MOD 30 MIN: CPT | Performed by: INTERNAL MEDICINE

## 2022-03-09 RX ORDER — ROSUVASTATIN CALCIUM 20 MG/1
20 TABLET, COATED ORAL DAILY
Qty: 90 TABLET | Refills: 0 | Status: SHIPPED | OUTPATIENT
Start: 2022-03-09 | End: 2022-07-20

## 2022-03-09 NOTE — PROGRESS NOTES
HPI and Plan:   See dictation          Orders Placed This Encounter   Procedures     Basic metabolic panel     Lipid Profile     ALT     Follow-Up with Cardiology     Echocardiogram Complete       Orders Placed This Encounter   Medications     rosuvastatin (CRESTOR) 20 MG tablet     Sig: Take 1 tablet (20 mg) by mouth daily INDICATION: TO LOWER CHOLESTEROL AND TO HELP REDUCE RISK OF HEART ATTACK AND STROKE     Dispense:  90 tablet     Refill:  0       Medications Discontinued During This Encounter   Medication Reason     rosuvastatin (CRESTOR) 20 MG tablet Reorder         Encounter Diagnoses   Name Primary?     Hyperlipidemia LDL goal <70      Acute anterior wall MI (H) Yes     Coronary artery disease involving native coronary artery of native heart without angina pectoris      Ischemic cardiomyopathy        CURRENT MEDICATIONS:  Current Outpatient Medications   Medication Sig Dispense Refill     aspirin 81 MG tablet Take 1 tablet (81 mg) by mouth daily INDICATION: FOR HEART AND CARDIOVASCULAR HEALTH 100 tablet 3     B Complex-Biotin-FA (R-[id number]-923) TABS Take 1 capsule by mouth daily INDICATION: THIAMINE/VITAMIN DEFICIENCY (MUST HAVE AT LEAST 100 MG OF THIAMINE) 100 tablet PRN     Calcium Carb-Cholecalciferol (CALCIUM 1000 + D) 1000-800 MG-UNIT TABS Take 1 tablet by mouth daily TAKE WITH FOOD, FOR BONE HEALTH AND FOR VITAMIN D SUPPLEMENTATION 100 tablet PRN     levothyroxine (SYNTHROID/LEVOTHROID) 137 MCG tablet Take 1 tablet (137 mcg) by mouth daily INDICATION: THYROID SUPPLEMENTATION, TAKE FIRST THING IN THE MORNING ON AN EMPTY STOMACH,  NO FOOD FOR AN HOUR (Patient taking differently: Take 150 mcg by mouth daily INDICATION: THYROID SUPPLEMENTATION, TAKE FIRST THING IN THE MORNING ON AN EMPTY STOMACH,  NO FOOD FOR AN HOUR) 90 tablet 3     losartan (COZAAR) 25 MG tablet Take 0.5 tablets (12.5 mg) by mouth daily 45 tablet 0     metoprolol succinate ER (TOPROL-XL) 50 MG 24 hr tablet Take 1 tablet (50 mg) by mouth  daily 90 tablet 0     rosuvastatin (CRESTOR) 20 MG tablet Take 1 tablet (20 mg) by mouth daily INDICATION: TO LOWER CHOLESTEROL AND TO HELP REDUCE RISK OF HEART ATTACK AND STROKE 90 tablet 0     vitamin D3 (CHOLECALCIFEROL) 96402 UNITS capsule TAKE ONE CAP EVERY OTHER WEEK, FOR VITAMIN D DEFICIENCY ( AND 15TH OF EACH MONTH) (Patient taking differently: every 7 days ) 40 capsule PRN     folic acid (FOLVITE) 1 MG tablet Take 1 tablet (1 mg) by mouth daily INDICATION: VITAMIN SUPPLEMENTATION (Patient not taking: Reported on 3/9/2022) 100 tablet 3       ALLERGIES     Allergies   Allergen Reactions     Lisinopril      hypotension     Sulfa Drugs      Zithromax [Azithromycin]        PAST MEDICAL HISTORY:  Past Medical History:   Diagnosis Date     Cardiac arrest (H)     V fib cardiac arrest. 2010     Coronary artery disease      Hyperlipidaemia      Hypothyroidism      Ischemic cardiomyopathy     EF now normalized     Myocardial infarction (H)     Anterior MI 2010       PAST SURGICAL HISTORY:  Past Surgical History:   Procedure Laterality Date     CARDIAC CATHERIZATION       CORONARY ANGIOGRAPHY ADULT ORDER      Emergent cath 2/21/10- SUPRIYA to proximal LAD       FAMILY HISTORY:  Family History   Problem Relation Age of Onset     Cancer Father 74        LUNG     Prostate Cancer Father      Gastrointestinal Disease Mother        SOCIAL HISTORY:  Social History     Socioeconomic History     Marital status:      Spouse name: None     Number of children: None     Years of education: None     Highest education level: None   Occupational History     None   Tobacco Use     Smoking status: Former Smoker     Packs/day: 0.20     Years: 15.00     Pack years: 3.00     Types: Cigarettes     Start date:      Quit date: 2010     Years since quittin.0     Smokeless tobacco: Never Used   Substance and Sexual Activity     Alcohol use: Yes     Comment: Occasional glass of wine.     Drug use: No      "Sexual activity: Yes     Partners: Male   Other Topics Concern     Parent/sibling w/ CABG, MI or angioplasty before 65F 55M? Not Asked   Social History Narrative     None     Social Determinants of Health     Financial Resource Strain: Not on file   Food Insecurity: Not on file   Transportation Needs: Not on file   Physical Activity: Not on file   Stress: Not on file   Social Connections: Not on file   Intimate Partner Violence: Not on file   Housing Stability: Not on file       Review of Systems:  Skin:  Negative bruising     Eyes:  Positive for glasses    ENT:  Negative      Respiratory:  Negative       Cardiovascular:  Negative      Gastroenterology: Negative for reflux;heartburn    Genitourinary:  Negative      Musculoskeletal:  Negative back pain    Neurologic:  Negative headaches;migraine headaches;numbness or tingling of hands;numbness or tingling of feet    Psychiatric:  Negative      Heme/Lymph/Imm:  Positive for allergies    Endocrine:  Positive for thyroid disorder      Physical Exam:  Vitals: /79   Pulse 83   Ht 1.619 m (5' 3.75\")   Wt 74.3 kg (163 lb 14.4 oz)   SpO2 97%   BMI 28.35 kg/m      Constitutional:  cooperative, alert and oriented, well developed, well nourished, in no acute distress        Skin:  warm and dry to the touch, no apparent skin lesions or masses noted          Head:  normocephalic, no masses or lesions        Eyes:  pupils equal and round        Lymph:      ENT:  no pallor or cyanosis, dentition good        Neck:  carotid pulses are full and equal bilaterally, JVP normal, no carotid bruit        Respiratory:  normal breath sounds, clear to auscultation, normal A-P diameter, normal symmetry, normal respiratory excursion, no use of accessory muscles         Cardiac: regular rhythm, normal S1/S2, no S3 or S4, apical impulse not displaced, no murmurs, gallops or rubs                pulses full and equal, no bruits auscultated                                        GI:  " abdomen soft, non-tender, BS normoactive, no mass, no HSM, no bruits        Extremities and Muscular Skeletal:  no deformities, clubbing, cyanosis, erythema observed;no edema              Neurological:  no gross motor deficits;affect appropriate        Psych:  Alert and Oriented x 3        CC  No referring provider defined for this encounter.

## 2022-03-09 NOTE — LETTER
3/9/2022    Bárbara Esposito MD  [address]  [address]  [facility] [address]    RE: Jaymie Mares       Dear Colleague,     I had the pleasure of seeing Jaymie Mares in the [facility].  HPI and Plan:   See dictation          Orders Placed This Encounter   Procedures     Basic metabolic panel     Lipid Profile     ALT     Follow-Up with Cardiology     Echocardiogram Complete       Orders Placed This Encounter   Medications     rosuvastatin (CRESTOR) 20 MG tablet     Sig: Take 1 tablet (20 mg) by mouth daily INDICATION: TO LOWER CHOLESTEROL AND TO HELP REDUCE RISK OF HEART ATTACK AND STROKE     Dispense:  90 tablet     Refill:  0       Medications Discontinued During This Encounter   Medication Reason     rosuvastatin (CRESTOR) 20 MG tablet Reorder         Encounter Diagnoses   Name Primary?     Hyperlipidemia LDL goal <70      Acute anterior wall MI (H) Yes     Coronary artery disease involving native coronary artery of native heart without angina pectoris      Ischemic cardiomyopathy        CURRENT MEDICATIONS:  Current Outpatient Medications   Medication Sig Dispense Refill     aspirin 81 MG tablet Take 1 tablet (81 mg) by mouth daily INDICATION: FOR HEART AND CARDIOVASCULAR HEALTH 100 tablet 3     B Complex-Biotin-FA ([id number]-077) TABS Take 1 capsule by mouth daily INDICATION: THIAMINE/VITAMIN DEFICIENCY (MUST HAVE AT LEAST 100 MG OF THIAMINE) 100 tablet PRN     Calcium Carb-Cholecalciferol (CALCIUM 1000 + D) 1000-800 MG-UNIT TABS Take 1 tablet by mouth daily TAKE WITH FOOD, FOR BONE HEALTH AND FOR VITAMIN D SUPPLEMENTATION 100 tablet PRN     levothyroxine (SYNTHROID/LEVOTHROID) 137 MCG tablet Take 1 tablet (137 mcg) by mouth daily INDICATION: THYROID SUPPLEMENTATION, TAKE FIRST THING IN THE MORNING ON AN EMPTY STOMACH,  NO FOOD FOR AN HOUR (Patient taking differently: Take 150 mcg by mouth daily INDICATION: THYROID SUPPLEMENTATION, TAKE FIRST THING IN THE MORNING ON AN EMPTY  STOMACH,  NO FOOD FOR AN HOUR) 90 tablet 3     losartan (COZAAR) 25 MG tablet Take 0.5 tablets (12.5 mg) by mouth daily 45 tablet 0     metoprolol succinate ER (TOPROL-XL) 50 MG 24 hr tablet Take 1 tablet (50 mg) by mouth daily 90 tablet 0     rosuvastatin (CRESTOR) 20 MG tablet Take 1 tablet (20 mg) by mouth daily INDICATION: TO LOWER CHOLESTEROL AND TO HELP REDUCE RISK OF HEART ATTACK AND STROKE 90 tablet 0     vitamin D3 (CHOLECALCIFEROL) 22346 UNITS capsule TAKE ONE CAP EVERY OTHER WEEK, FOR VITAMIN D DEFICIENCY (1ST AND 15TH OF EACH MONTH) (Patient taking differently: every 7 days ) 40 capsule PRN     folic acid (FOLVITE) 1 MG tablet Take 1 tablet (1 mg) by mouth daily INDICATION: VITAMIN SUPPLEMENTATION (Patient not taking: Reported on 3/9/2022) 100 tablet 3       ALLERGIES     Allergies   Allergen Reactions     Lisinopril      hypotension     Sulfa Drugs      Zithromax [Azithromycin]        PAST MEDICAL HISTORY:  Past Medical History:   Diagnosis Date     Cardiac arrest (H)     V fib cardiac arrest. February 2010     Coronary artery disease      Hyperlipidaemia      Hypothyroidism      Ischemic cardiomyopathy     EF now normalized     Myocardial infarction (H)     Anterior MI February 2010       PAST SURGICAL HISTORY:  Past Surgical History:   Procedure Laterality Date     CARDIAC CATHERIZATION       CORONARY ANGIOGRAPHY ADULT ORDER      Emergent cath 2/21/10- SUPRIYA to proximal LAD       FAMILY HISTORY:  Family History   Problem Relation Age of Onset     Cancer Father 74        LUNG     Prostate Cancer Father      Gastrointestinal Disease Mother        SOCIAL HISTORY:  Social History     Socioeconomic History     Marital status:      Spouse name: None     Number of children: None     Years of education: None     Highest education level: None   Occupational History     None   Tobacco Use     Smoking status: Former Smoker     Packs/day: 0.20     Years: 15.00     Pack years: 3.00     Types: Cigarettes      "Start date:      Quit date: 2010     Years since quittin.0     Smokeless tobacco: Never Used   Substance and Sexual Activity     Alcohol use: Yes     Comment: Occasional glass of wine.     Drug use: No     Sexual activity: Yes     Partners: Male   Other Topics Concern     Parent/sibling w/ CABG, MI or angioplasty before 65F 55M? Not Asked   Social History Narrative     None     Social Determinants of Health     Financial Resource Strain: Not on file   Food Insecurity: Not on file   Transportation Needs: Not on file   Physical Activity: Not on file   Stress: Not on file   Social Connections: Not on file   Intimate Partner Violence: Not on file   Housing Stability: Not on file       Review of Systems:  Skin:  Negative bruising     Eyes:  Positive for glasses    ENT:  Negative      Respiratory:  Negative       Cardiovascular:  Negative      Gastroenterology: Negative for reflux;heartburn    Genitourinary:  Negative      Musculoskeletal:  Negative back pain    Neurologic:  Negative headaches;migraine headaches;numbness or tingling of hands;numbness or tingling of feet    Psychiatric:  Negative      Heme/Lymph/Imm:  Positive for allergies    Endocrine:  Positive for thyroid disorder      Physical Exam:  Vitals: /79   Pulse 83   Ht 1.619 m (5' 3.75\")   Wt 74.3 kg (163 lb 14.4 oz)   SpO2 97%   BMI 28.35 kg/m      Constitutional:  cooperative, alert and oriented, well developed, well nourished, in no acute distress        Skin:  warm and dry to the touch, no apparent skin lesions or masses noted          Head:  normocephalic, no masses or lesions        Eyes:  pupils equal and round        Lymph:      ENT:  no pallor or cyanosis, dentition good        Neck:  carotid pulses are full and equal bilaterally, JVP normal, no carotid bruit        Respiratory:  normal breath sounds, clear to auscultation, normal A-P diameter, normal symmetry, normal respiratory excursion, no use of accessory muscles     "     Cardiac: regular rhythm, normal S1/S2, no S3 or S4, apical impulse not displaced, no murmurs, gallops or rubs                pulses full and equal, no bruits auscultated                                        GI:  abdomen soft, non-tender, BS normoactive, no mass, no HSM, no bruits        Extremities and Muscular Skeletal:  no deformities, clubbing, cyanosis, erythema observed;no edema              Neurological:  no gross motor deficits;affect appropriate        Psych:  Alert and Oriented x 3        CC  No referring provider defined for this encounter.    Thank you for allowing me to participate in the care of your patient.      Sincerely,     Hector Chavez MD, MD     [facility]  cc:   No referring provider defined for this encounter.

## 2022-03-09 NOTE — PROGRESS NOTES
Service Date: 03/09/2022    REFERRING PHYSICIAN:  Dr. Bárbara Esposito.    HISTORY OF PRESENT ILLNESS:  It is my pleasure to see your patient, Jaymie Mares.  This is an extremely nice 61-year-old patient who suffered an acute anterior myocardial infarct when she was 49 years of age, complicated by cardiac arrest and was resuscitated, and then underwent stenting of the left anterior descending artery.  Fortunately, her ejection fraction did return to normal, and her ejection fraction is now 55%-60%.  Echocardiography which was performed 1 month ago showed that the EF is 60%-65% with normal diastolic function and there is no significant valvular dysfunction.  The lipid profile is probably not correct.  This patient's rosuvastatin dose has not been changed, and yet her LDL cholesterol is at less than 5 and her HDL is 116.  Previously, her LDL was 53 and 62, both of which are excellent, but I think the less than 5 is probably not correct.  Her triglycerides are normal at 122.  Her renal function is borderline.  Sodium is 141, potassium is 4.1, BUN is 15, and creatinine is 1.07 with a GFR of 60.    Her blood pressure is well controlled at 130/79 with a pulse of 83 beats per minute.    IMPRESSION:    1.  Status post anterior myocardial infarct with stenting of the LAD and a resuscitated cardiac arrest.  The patient's ejection fraction is normal, and her diastolic function is also normal.  2.  Asymptomatic with respect to coronary artery disease.  3.  Excellent lipid profile, but I suspect that the LDL cholesterol less than 5 is erroneous.  Her previous LDL cholesterol, as I mentioned, were in the 50s and 60s before.    PLAN:  We will continue on the patient's excellent medical therapy, and we will see her back again in 1 year's time with repeat echocardiogram to follow her cardiac function.    It has been my pleasure to be involved in the care of this very nice patient.    Hector Chavez MD, FACC    cc:  Bárbara  MD Vaibhav  [facility]  [address], [address]  [place] [address]    Hector Maurice MD, [facility]        D: 2022   T: 2022   MT: [email or web address]    Name:     GLENDA FULTON  MRN:      [id number]        Account:      [id number]   :      1960           Service Date: 2022       Document: [id number]

## 2022-04-12 DIAGNOSIS — I25.10 CORONARY ARTERY DISEASE INVOLVING NATIVE CORONARY ARTERY OF NATIVE HEART WITHOUT ANGINA PECTORIS: ICD-10-CM

## 2022-04-12 RX ORDER — LOSARTAN POTASSIUM 25 MG/1
12.5 TABLET ORAL DAILY
Qty: 45 TABLET | Refills: 3 | Status: SHIPPED | OUTPATIENT
Start: 2022-04-12 | End: 2023-09-12

## 2022-05-09 ENCOUNTER — OFFICE VISIT (OUTPATIENT)
Dept: FAMILY MEDICINE | Facility: CLINIC | Age: 62
End: 2022-05-09
Payer: COMMERCIAL

## 2022-05-09 VITALS
HEART RATE: 96 BPM | OXYGEN SATURATION: 98 % | SYSTOLIC BLOOD PRESSURE: 155 MMHG | WEIGHT: 161 LBS | TEMPERATURE: 97.2 F | BODY MASS INDEX: 27.49 KG/M2 | RESPIRATION RATE: 16 BRPM | DIASTOLIC BLOOD PRESSURE: 79 MMHG | HEIGHT: 64 IN

## 2022-05-09 DIAGNOSIS — Z12.31 ENCOUNTER FOR SCREENING MAMMOGRAM FOR BREAST CANCER: ICD-10-CM

## 2022-05-09 DIAGNOSIS — N18.31 STAGE 3A CHRONIC KIDNEY DISEASE (H): ICD-10-CM

## 2022-05-09 DIAGNOSIS — Z86.74 HISTORY OF CARDIAC ARREST: ICD-10-CM

## 2022-05-09 DIAGNOSIS — E03.9 HYPOTHYROIDISM, UNSPECIFIED TYPE: Primary | ICD-10-CM

## 2022-05-09 DIAGNOSIS — R73.01 IMPAIRED FASTING GLUCOSE: ICD-10-CM

## 2022-05-09 DIAGNOSIS — I25.10 CORONARY ARTERY DISEASE INVOLVING NATIVE HEART WITHOUT ANGINA PECTORIS, UNSPECIFIED VESSEL OR LESION TYPE: ICD-10-CM

## 2022-05-09 DIAGNOSIS — I10 ESSENTIAL HYPERTENSION: ICD-10-CM

## 2022-05-09 DIAGNOSIS — E78.5 DYSLIPIDEMIA: ICD-10-CM

## 2022-05-09 LAB
BASOPHILS # BLD AUTO: 0 10E3/UL (ref 0–0.2)
BASOPHILS NFR BLD AUTO: 0 %
EOSINOPHIL # BLD AUTO: 0.2 10E3/UL (ref 0–0.7)
EOSINOPHIL NFR BLD AUTO: 2 %
ERYTHROCYTE [DISTWIDTH] IN BLOOD BY AUTOMATED COUNT: 14.2 % (ref 10–15)
HBA1C MFR BLD: 5.8 % (ref 0–5.6)
HCT VFR BLD AUTO: 42.6 % (ref 35–47)
HGB BLD-MCNC: 13.8 G/DL (ref 11.7–15.7)
LYMPHOCYTES # BLD AUTO: 3.8 10E3/UL (ref 0.8–5.3)
LYMPHOCYTES NFR BLD AUTO: 34 %
MCH RBC QN AUTO: 28 PG (ref 26.5–33)
MCHC RBC AUTO-ENTMCNC: 32.4 G/DL (ref 31.5–36.5)
MCV RBC AUTO: 87 FL (ref 78–100)
MONOCYTES # BLD AUTO: 0.8 10E3/UL (ref 0–1.3)
MONOCYTES NFR BLD AUTO: 7 %
NEUTROPHILS # BLD AUTO: 6.4 10E3/UL (ref 1.6–8.3)
NEUTROPHILS NFR BLD AUTO: 57 %
PLATELET # BLD AUTO: 228 10E3/UL (ref 150–450)
RBC # BLD AUTO: 4.92 10E6/UL (ref 3.8–5.2)
WBC # BLD AUTO: 11.3 10E3/UL (ref 4–11)

## 2022-05-09 PROCEDURE — 99203 OFFICE O/P NEW LOW 30 MIN: CPT | Performed by: INTERNAL MEDICINE

## 2022-05-09 PROCEDURE — 83036 HEMOGLOBIN GLYCOSYLATED A1C: CPT | Performed by: INTERNAL MEDICINE

## 2022-05-09 PROCEDURE — 80050 GENERAL HEALTH PANEL: CPT | Performed by: INTERNAL MEDICINE

## 2022-05-09 PROCEDURE — 84439 ASSAY OF FREE THYROXINE: CPT | Performed by: INTERNAL MEDICINE

## 2022-05-09 PROCEDURE — 36415 COLL VENOUS BLD VENIPUNCTURE: CPT | Performed by: INTERNAL MEDICINE

## 2022-05-09 ASSESSMENT — PAIN SCALES - GENERAL: PAINLEVEL: NO PAIN (0)

## 2022-05-10 PROBLEM — R73.03 PREDIABETES: Chronic | Status: ACTIVE | Noted: 2022-05-10

## 2022-05-10 PROBLEM — R73.03 PREDIABETES: Status: ACTIVE | Noted: 2022-05-10

## 2022-05-10 LAB
ALBUMIN SERPL-MCNC: 3.8 G/DL (ref 3.4–5)
ALP SERPL-CCNC: 113 U/L (ref 40–150)
ALT SERPL W P-5'-P-CCNC: 31 U/L (ref 0–50)
ANION GAP SERPL CALCULATED.3IONS-SCNC: 3 MMOL/L (ref 3–14)
AST SERPL W P-5'-P-CCNC: 18 U/L (ref 0–45)
BILIRUB SERPL-MCNC: 0.4 MG/DL (ref 0.2–1.3)
BUN SERPL-MCNC: 20 MG/DL (ref 7–30)
CALCIUM SERPL-MCNC: 9.8 MG/DL (ref 8.5–10.1)
CHLORIDE BLD-SCNC: 111 MMOL/L (ref 94–109)
CO2 SERPL-SCNC: 26 MMOL/L (ref 20–32)
CREAT SERPL-MCNC: 0.93 MG/DL (ref 0.52–1.04)
GFR SERPL CREATININE-BSD FRML MDRD: 70 ML/MIN/1.73M2
GLUCOSE BLD-MCNC: 126 MG/DL (ref 70–99)
POTASSIUM BLD-SCNC: 4.7 MMOL/L (ref 3.4–5.3)
PROT SERPL-MCNC: 8.3 G/DL (ref 6.8–8.8)
SODIUM SERPL-SCNC: 140 MMOL/L (ref 133–144)
T4 FREE SERPL-MCNC: 1.36 NG/DL (ref 0.76–1.46)
TSH SERPL DL<=0.005 MIU/L-ACNC: 0.28 MU/L (ref 0.4–4)

## 2022-05-10 RX ORDER — LEVOTHYROXINE SODIUM 137 UG/1
137 TABLET ORAL DAILY
Qty: 90 TABLET | Refills: 0 | Status: SHIPPED | OUTPATIENT
Start: 2022-05-10 | End: 2022-08-17

## 2022-05-26 ENCOUNTER — ANCILLARY PROCEDURE (OUTPATIENT)
Dept: MAMMOGRAPHY | Facility: CLINIC | Age: 62
End: 2022-05-26
Attending: INTERNAL MEDICINE
Payer: COMMERCIAL

## 2022-05-26 DIAGNOSIS — Z12.31 ENCOUNTER FOR SCREENING MAMMOGRAM FOR BREAST CANCER: ICD-10-CM

## 2022-05-26 PROCEDURE — 77063 BREAST TOMOSYNTHESIS BI: CPT | Mod: TC | Performed by: RADIOLOGY

## 2022-05-26 PROCEDURE — 77067 SCR MAMMO BI INCL CAD: CPT | Mod: TC | Performed by: RADIOLOGY

## 2022-07-14 DIAGNOSIS — I21.09 ACUTE ANTERIOR WALL MI (H): ICD-10-CM

## 2022-07-14 DIAGNOSIS — I25.10 CORONARY ARTERY DISEASE INVOLVING NATIVE CORONARY ARTERY OF NATIVE HEART WITHOUT ANGINA PECTORIS: ICD-10-CM

## 2022-07-14 RX ORDER — METOPROLOL SUCCINATE 50 MG/1
50 TABLET, EXTENDED RELEASE ORAL DAILY
Qty: 90 TABLET | Refills: 3 | Status: SHIPPED | OUTPATIENT
Start: 2022-07-14 | End: 2023-10-16

## 2022-07-14 NOTE — TELEPHONE ENCOUNTER
Received refill request for:  Metoprolol  Last OV was: 3/9/22 with Dr. SOLORZANO  Labs/EKG: N/a  F/U scheduled: Orders for 3/2023  New script sent to: [facility]

## 2022-07-15 ENCOUNTER — LAB (OUTPATIENT)
Dept: LAB | Facility: CLINIC | Age: 62
End: 2022-07-15
Payer: COMMERCIAL

## 2022-07-15 DIAGNOSIS — E03.9 HYPOTHYROIDISM, UNSPECIFIED TYPE: ICD-10-CM

## 2022-07-15 LAB
HBA1C MFR BLD: 5.3 % (ref 0–5.6)
WBC # BLD AUTO: 9.4 10E3/UL (ref 4–11)

## 2022-07-15 PROCEDURE — 84443 ASSAY THYROID STIM HORMONE: CPT

## 2022-07-15 PROCEDURE — 85048 AUTOMATED LEUKOCYTE COUNT: CPT

## 2022-07-15 PROCEDURE — 83036 HEMOGLOBIN GLYCOSYLATED A1C: CPT

## 2022-07-15 PROCEDURE — 36415 COLL VENOUS BLD VENIPUNCTURE: CPT

## 2022-07-17 LAB — TSH SERPL DL<=0.005 MIU/L-ACNC: 1.51 MU/L (ref 0.4–4)

## 2022-07-20 DIAGNOSIS — E78.5 HYPERLIPIDEMIA LDL GOAL <70: ICD-10-CM

## 2022-07-20 RX ORDER — ROSUVASTATIN CALCIUM 20 MG/1
20 TABLET, COATED ORAL DAILY
Qty: 90 TABLET | Refills: 3 | Status: SHIPPED | OUTPATIENT
Start: 2022-07-20 | End: 2023-09-12

## 2022-07-20 NOTE — TELEPHONE ENCOUNTER
Received refill request for:  Rosuvastatin  Last OV was: 3/9/22 Dr. SOLORZANO  Labs/EK22 Lipids  F/U scheduled: Orders for 3/2012  New script sent to: [facility]

## 2022-08-17 DIAGNOSIS — E03.9 HYPOTHYROIDISM, UNSPECIFIED TYPE: ICD-10-CM

## 2022-08-18 RX ORDER — LEVOTHYROXINE SODIUM 137 UG/1
137 TABLET ORAL DAILY
Qty: 90 TABLET | Refills: 2 | Status: SHIPPED | OUTPATIENT
Start: 2022-08-18 | End: 2023-06-05

## 2022-08-18 NOTE — TELEPHONE ENCOUNTER
Prescription approved per [facility] Refill Protocol.    Gladis Nickerson, RN BSN MSN  [facility]

## 2022-10-16 ENCOUNTER — HEALTH MAINTENANCE LETTER (OUTPATIENT)
Age: 62
End: 2022-10-16

## 2023-02-22 ENCOUNTER — TELEPHONE (OUTPATIENT)
Dept: CARDIOLOGY | Facility: CLINIC | Age: 63
End: 2023-02-22

## 2023-02-22 DIAGNOSIS — I25.5 ISCHEMIC CARDIOMYOPATHY: ICD-10-CM

## 2023-02-22 DIAGNOSIS — I25.10 CORONARY ARTERY DISEASE INVOLVING NATIVE CORONARY ARTERY OF NATIVE HEART WITHOUT ANGINA PECTORIS: ICD-10-CM

## 2023-02-22 DIAGNOSIS — I10 ESSENTIAL HYPERTENSION: ICD-10-CM

## 2023-02-22 DIAGNOSIS — E78.5 HYPERLIPIDEMIA LDL GOAL <70: Primary | ICD-10-CM

## 2023-02-22 NOTE — TELEPHONE ENCOUNTER
Health Call Center    Phone Message    May a detailed message be left on voicemail: no     Reason for Call: Other: Jaymie called to schedule a F/U with Dr. hCavez, 1st available was 7/7/23. Please extend the orders for the echocardiogram and labs so she can schedule them closer to her office visit.     Action Taken: Other: [facility]    Travel Screening: Not Applicable

## 2023-03-26 ENCOUNTER — HEALTH MAINTENANCE LETTER (OUTPATIENT)
Age: 63
End: 2023-03-26

## 2023-06-30 ENCOUNTER — HOSPITAL ENCOUNTER (OUTPATIENT)
Dept: CARDIOLOGY | Facility: CLINIC | Age: 63
Discharge: HOME | End: 2023-06-30
Attending: INTERNAL MEDICINE | Admitting: INTERNAL MEDICINE
Payer: COMMERCIAL

## 2023-06-30 ENCOUNTER — LAB (OUTPATIENT)
Dept: LAB | Facility: CLINIC | Age: 63
End: 2023-06-30
Attending: INTERNAL MEDICINE
Payer: COMMERCIAL

## 2023-06-30 DIAGNOSIS — I25.10 CORONARY ARTERY DISEASE INVOLVING NATIVE CORONARY ARTERY OF NATIVE HEART WITHOUT ANGINA PECTORIS: ICD-10-CM

## 2023-06-30 DIAGNOSIS — I25.5 ISCHEMIC CARDIOMYOPATHY: ICD-10-CM

## 2023-06-30 DIAGNOSIS — I10 ESSENTIAL HYPERTENSION: ICD-10-CM

## 2023-06-30 DIAGNOSIS — E78.5 HYPERLIPIDEMIA LDL GOAL <70: ICD-10-CM

## 2023-06-30 LAB
ALT SERPL W P-5'-P-CCNC: 23 U/L (ref 0–50)
ANION GAP SERPL CALCULATED.3IONS-SCNC: 9 MMOL/L (ref 7–15)
BUN SERPL-MCNC: 16.2 MG/DL (ref 8–23)
CALCIUM SERPL-MCNC: 9.7 MG/DL (ref 8.8–10.2)
CHLORIDE SERPL-SCNC: 106 MMOL/L (ref 98–107)
CHOLEST SERPL-MCNC: 158 MG/DL
CREAT SERPL-MCNC: 1.1 MG/DL (ref 0.51–0.95)
DEPRECATED HCO3 PLAS-SCNC: 27 MMOL/L (ref 22–29)
GFR SERPL CREATININE-BSD FRML MDRD: 57 ML/MIN/1.73M2
GLUCOSE SERPL-MCNC: 102 MG/DL (ref 70–99)
HDLC SERPL-MCNC: 87 MG/DL
LDLC SERPL CALC-MCNC: 54 MG/DL
LVEF ECHO: NORMAL
NONHDLC SERPL-MCNC: 71 MG/DL
POTASSIUM SERPL-SCNC: 4.2 MMOL/L (ref 3.4–5.3)
SODIUM SERPL-SCNC: 142 MMOL/L (ref 136–145)
TRIGL SERPL-MCNC: 84 MG/DL

## 2023-06-30 PROCEDURE — 80048 BASIC METABOLIC PNL TOTAL CA: CPT | Performed by: INTERNAL MEDICINE

## 2023-06-30 PROCEDURE — 93306 TTE W/DOPPLER COMPLETE: CPT

## 2023-06-30 PROCEDURE — 36415 COLL VENOUS BLD VENIPUNCTURE: CPT | Performed by: INTERNAL MEDICINE

## 2023-06-30 PROCEDURE — 80061 LIPID PANEL: CPT | Performed by: INTERNAL MEDICINE

## 2023-06-30 PROCEDURE — 93306 TTE W/DOPPLER COMPLETE: CPT | Mod: 26 | Performed by: INTERNAL MEDICINE

## 2023-06-30 PROCEDURE — 84460 ALANINE AMINO (ALT) (SGPT): CPT | Performed by: INTERNAL MEDICINE

## 2023-07-24 ENCOUNTER — DOCUMENTATION ONLY (OUTPATIENT)
Dept: LAB | Facility: CLINIC | Age: 63
End: 2023-07-24
Payer: COMMERCIAL

## 2023-07-24 DIAGNOSIS — E03.9 HYPOTHYROIDISM, UNSPECIFIED TYPE: Primary | ICD-10-CM

## 2023-07-24 NOTE — PROGRESS NOTES
Jaymie GABRIELLA Mares has an upcoming lab appointment:    Future Appointments   Date Time Provider Department [place]   8/3/2023  8:15 AM CS LAB [facility]    8/11/2023  7:00 AM Linda Latham DO [facility]    8/21/2023  1:45 PM Hector Chavez MD [facility]     Patient is scheduled for the following lab(s): pt is requesting labs. Please order if necessary, thank you    There is no order available. Please review and place either future orders or HMPO (Review of Health Maintenance Protocol Orders), as appropriate.    Health Maintenance Due   Topic    MICROALBUMIN     ANNUAL REVIEW OF HM ORDERS     HIV SCREENING     CMP     HEMOGLOBIN     TSH W/FREE T4 REFLEX      Emy Jaimes

## 2023-08-03 ENCOUNTER — LAB (OUTPATIENT)
Dept: LAB | Facility: CLINIC | Age: 63
End: 2023-08-03
Payer: COMMERCIAL

## 2023-08-03 DIAGNOSIS — E03.9 HYPOTHYROIDISM, UNSPECIFIED TYPE: ICD-10-CM

## 2023-08-03 LAB
BASOPHILS # BLD AUTO: 0.1 10E3/UL (ref 0–0.2)
BASOPHILS NFR BLD AUTO: 1 %
EOSINOPHIL # BLD AUTO: 0.1 10E3/UL (ref 0–0.7)
EOSINOPHIL NFR BLD AUTO: 1 %
ERYTHROCYTE [DISTWIDTH] IN BLOOD BY AUTOMATED COUNT: 12.9 % (ref 10–15)
HCT VFR BLD AUTO: 44.1 % (ref 35–47)
HGB BLD-MCNC: 14.3 G/DL (ref 11.7–15.7)
IMM GRANULOCYTES # BLD: 0 10E3/UL
IMM GRANULOCYTES NFR BLD: 0 %
LYMPHOCYTES # BLD AUTO: 2.6 10E3/UL (ref 0.8–5.3)
LYMPHOCYTES NFR BLD AUTO: 35 %
MCH RBC QN AUTO: 30.2 PG (ref 26.5–33)
MCHC RBC AUTO-ENTMCNC: 32.4 G/DL (ref 31.5–36.5)
MCV RBC AUTO: 93 FL (ref 78–100)
MONOCYTES # BLD AUTO: 1 10E3/UL (ref 0–1.3)
MONOCYTES NFR BLD AUTO: 13 %
NEUTROPHILS # BLD AUTO: 3.7 10E3/UL (ref 1.6–8.3)
NEUTROPHILS NFR BLD AUTO: 50 %
PLATELET # BLD AUTO: 270 10E3/UL (ref 150–450)
RBC # BLD AUTO: 4.73 10E6/UL (ref 3.8–5.2)
T4 FREE SERPL-MCNC: 1.94 NG/DL (ref 0.9–1.7)
TSH SERPL DL<=0.005 MIU/L-ACNC: 0.07 UIU/ML (ref 0.3–4.2)
WBC # BLD AUTO: 7.3 10E3/UL (ref 4–11)

## 2023-08-03 PROCEDURE — 36415 COLL VENOUS BLD VENIPUNCTURE: CPT

## 2023-08-03 PROCEDURE — 84443 ASSAY THYROID STIM HORMONE: CPT

## 2023-08-03 PROCEDURE — 85025 COMPLETE CBC W/AUTO DIFF WBC: CPT

## 2023-08-03 PROCEDURE — 84439 ASSAY OF FREE THYROXINE: CPT

## 2023-08-11 ENCOUNTER — OFFICE VISIT (OUTPATIENT)
Dept: FAMILY MEDICINE | Facility: CLINIC | Age: 63
End: 2023-08-11
Payer: COMMERCIAL

## 2023-08-11 VITALS
WEIGHT: 158.2 LBS | SYSTOLIC BLOOD PRESSURE: 123 MMHG | OXYGEN SATURATION: 96 % | RESPIRATION RATE: 16 BRPM | HEIGHT: 64 IN | HEART RATE: 81 BPM | DIASTOLIC BLOOD PRESSURE: 76 MMHG | TEMPERATURE: 97.5 F | BODY MASS INDEX: 27.01 KG/M2

## 2023-08-11 DIAGNOSIS — N18.31 STAGE 3A CHRONIC KIDNEY DISEASE (H): Chronic | ICD-10-CM

## 2023-08-11 DIAGNOSIS — E03.9 HYPOTHYROIDISM, UNSPECIFIED TYPE: Primary | Chronic | ICD-10-CM

## 2023-08-11 PROCEDURE — 99213 OFFICE O/P EST LOW 20 MIN: CPT | Performed by: INTERNAL MEDICINE

## 2023-08-11 ASSESSMENT — PAIN SCALES - GENERAL: PAINLEVEL: NO PAIN (0)

## 2023-08-11 NOTE — PROGRESS NOTES
"  Assessment & Plan     Hypothyroidism, unspecified type  Dosage adjusted last year and TSH was wnl on that check, abnormal on most recent check though without any symptoms.  She is feeling well  Recommend close surveillance with repeat TSH/follow-up in three months  If still abnormal, consider adjustments  TSH   Date Value Ref Range Status   08/03/2023 0.07 (L) 0.30 - 4.20 uIU/mL Final   07/15/2022 1.51 0.40 - 4.00 mU/L Final   10/11/2018 0.30 0.40 - 4.50 mcU/mL Final       Stage 3a chronic kidney disease (H)  Discussed. She avoids regular NSAID use. She is working to stay well hydrated  Stable on recent labs      Return in about 3 months (around 11/11/2023) for Routine preventive, sooner if symptoms worsen or do not improve.      Linda Latham [facility] LEONEL DOSHI is a 62 year old, presenting for the following health issues:  RECHECK      History of Present Illness       Reason for visit:  Lab results    She eats 2-3 servings of fruits and vegetables daily.She consumes 0 sweetened beverage(s) daily.She exercises with enough effort to increase her heart rate 30 to 60 minutes per day.  She exercises with enough effort to increase her heart rate 5 days per week.   She is taking medications regularly.     Review lab results  She is aware she is due for cscope and mammo. She plans to schedule through [facility] for cscope and will schedule her mammo.   Feeling well, no med SE or new concerning symptoms      Review of Systems   Constitutional, HEENT, cardiovascular, pulmonary, gi and gu systems are negative, except as otherwise noted.      Objective    /76 (BP Location: Right arm, Patient Position: Sitting, Cuff Size: Adult Regular)   Pulse 81   Temp 97.5  F (36.4  C)   Resp 16   Ht 1.619 m (5' 3.75\")   Wt 71.8 kg (158 lb 3.2 oz)   SpO2 96%   BMI 27.37 kg/m    Body mass index is 27.37 kg/m .  Physical Exam     GENERAL APPEARANCE: AAOx3, no distress. Well developed.    PSYCH: " appropriate mood and affect.

## 2023-08-21 ENCOUNTER — VIRTUAL VISIT (OUTPATIENT)
Dept: CARDIOLOGY | Facility: CLINIC | Age: 63
End: 2023-08-21
Payer: COMMERCIAL

## 2023-08-21 DIAGNOSIS — I25.10 CORONARY ARTERY DISEASE INVOLVING NATIVE CORONARY ARTERY OF NATIVE HEART WITHOUT ANGINA PECTORIS: ICD-10-CM

## 2023-08-21 DIAGNOSIS — E78.5 HYPERLIPIDEMIA LDL GOAL <70: ICD-10-CM

## 2023-08-21 DIAGNOSIS — I25.5 ISCHEMIC CARDIOMYOPATHY: ICD-10-CM

## 2023-08-21 DIAGNOSIS — I10 ESSENTIAL HYPERTENSION: ICD-10-CM

## 2023-08-21 PROCEDURE — 99213 OFFICE O/P EST LOW 20 MIN: CPT | Mod: VID | Performed by: INTERNAL MEDICINE

## 2023-08-21 NOTE — PROGRESS NOTES
Service Date: 08/21/2023    VIDEO VISIT     CARDIOLOGY FOLLOWUP VISIT     REFERRING PHYSICIAN:  Linda Latham DO    HISTORY OF PRESENT ILLNESS:  It is my pleasure to see your patient on a video visit using the [employer] platform today.  This is a 63-year-old patient who suffered an anterior myocardial infarct complicated by a cardiac arrest with resuscitation and who then underwent stenting of the left anterior descending artery.  Fortunately, she had by echocardiography complete salvage of her myocardium with an EF in the 55%-60% range.  Since I last saw her, she has done well with no chest pains, no chest pressure and no unusual shortness of breath.  Her lipids were excellent with an LDL of 54, HDL of 87 and triglycerides of 84 and total cholesterol of 158.  Her liver function tests were normal with an ALT of 23.  The renal function revealed that her creatinine was slightly off at 1.10 with a GFR which was borderline reduced at 57, sodium 142, potassium 4.2 and BUN 16.2.  Her blood pressure, which she checked this morning, was excellent at 121/73 with a pulse of 72 beats per minute.  She is not suffering from any chest pains, chest pressure or unusual shortness of breath.    The echocardiogram, which was performed on 06/30, was excellent with an EF in the 60%-65% range and no significant valvular heart disease.    IMPRESSION:    1.  Coronary artery disease, status post anterior myocardial infarct, cardiac arrest and stenting of the LAD.  The patient is asymptomatic with respect to coronary artery disease with no symptoms suggestive of angina pectoris.  2.  Normal ejection fraction with no evidence of the prior anterior myocardial infarct.  3.  Excellent lipid profile, well within secondary prevention guidelines with no evidence of hepatic toxicity.  4.  Borderline renal dysfunction, possibly related to dehydration.  The patient tells me that she does not tend to drink enough water.  This is also being addressed by  Dr. Latham.      PLAN:  We will arrange to see the patient back again in 1 year's time.  We will check an echocardiogram, lipids and basic metabolic profile with hepatic function at that time.  As always, however, the patient has been told to contact us if she has any questions or any concerns.    Hector Maurice MD     cc:  [place] [address]    Hector Maurice MD, [facility]        D: 2023   T: 2023   MT: [email or web address]    Name:     GLENDA FULTON  MRN:      [id number]        Account:      [id number]   :      1960           Service Date: 2023       Document: [id number]

## 2023-08-21 NOTE — LETTER
8/21/2023    Linda Latham DO  [address] [place] [address]    RE: Glenda Mares       Dear Colleague,     I had the pleasure of seeing Glenda Mares in the [facility].  GLENDA is a 63 year old who is being evaluated via a billable video visit.      How would you like to obtain your AVS? Mail a copy  If the video visit is dropped, the invitation should be resent by: Other e-mail: [email or web address]  Will anyone else be joining your video visit? No        Review Of Systems  Respiratory: No shortness of breath, dyspnea on exertion, cough, or hemoptysis  Cardiovascular: negative    Patient reported vitals:  BP: 121/73  Heart rate: 72  Weight: 150lb    EDGAR Salazar CMA      Video-Visit Details    Type of service:  Video Visit   General:  no apparent distress, normal body habitus, sitting upright.  ENT/Mouth:  membranes moist, no nasal discharge.  Normal head shape, no apparent injury or laceration.  Eyes:  no scleral icterus, normal conjunctivae.  No observed jaundice.  Neck:  no apparent neck swelling.   Chest/Lungs:  No breathing difficulty while speaking.  No audible wheezing.  No cough during conversation.  Cardiovascular:  No obviously elevated jugular venous pressure.  No apparent edema bilaterally in LE.   Abdomen:  no obvious abdominal distention.   Extremities:  no apparent cyanosis.  Skin:  no xanthelasma.  No facial lacerations.  Neurologic:  Normal arm motion bilateral, no tremors.    Psychiatric:  Alert and oriented x3, calm demeanor    The rest of the comprehensive physical examination is deferred due to public health emergency video visit restrictions.     Originating Location (pt. Location): Home    Distant Location (provider location):  On-site  Platform used for Video Visit: MakerBot      Service Date: 08/21/2023    VIDEO VISIT     CARDIOLOGY FOLLOWUP VISIT     REFERRING PHYSICIAN:  Linda Latham DO    HISTORY OF PRESENT ILLNESS:  It is my pleasure to see your patient on a video  visit using the Optini platform today.  This is a 63-year-old patient who suffered an anterior myocardial infarct complicated by a cardiac arrest with resuscitation and who then underwent stenting of the left anterior descending artery.  Fortunately, she had by echocardiography complete salvage of her myocardium with an EF in the 55%-60% range.  Since I last saw her, she has done well with no chest pains, no chest pressure and no unusual shortness of breath.  Her lipids were excellent with an LDL of 54, HDL of 87 and triglycerides of 84 and total cholesterol of 158.  Her liver function tests were normal with an ALT of 23.  The renal function revealed that her creatinine was slightly off at 1.10 with a GFR which was borderline reduced at 57, sodium 142, potassium 4.2 and BUN 16.2.  Her blood pressure, which she checked this morning, was excellent at 121/73 with a pulse of 72 beats per minute.  She is not suffering from any chest pains, chest pressure or unusual shortness of breath.    The echocardiogram, which was performed on 06/30, was excellent with an EF in the 60%-65% range and no significant valvular heart disease.    IMPRESSION:    1.  Coronary artery disease, status post anterior myocardial infarct, cardiac arrest and stenting of the LAD.  The patient is asymptomatic with respect to coronary artery disease with no symptoms suggestive of angina pectoris.  2.  Normal ejection fraction with no evidence of the prior anterior myocardial infarct.  3.  Excellent lipid profile, well within secondary prevention guidelines with no evidence of hepatic toxicity.  4.  Borderline renal dysfunction, possibly related to dehydration.  The patient tells me that she does not tend to drink enough water.  This is also being addressed by Dr. Latham.      PLAN:  We will arrange to see the patient back again in 1 year's time.  We will check an echocardiogram, lipids and basic metabolic profile with hepatic function at that time.  As  always, however, the patient has been told to contact us if she has any questions or any concerns.    Hector Maurice MD     cc:  DO LAZARUS Cheek [address] [address]    Hector Maurice MD, [facility]        D: 2023   T: 2023   MT: [email or web address]    Name:     GLENDA FULOTN  MRN:      [id number]        Account:      [id number]   :      1960           Service Date: 2023       Document: [id number]       Thank you for allowing me to participate in the care of your patient.      Sincerely,     Hector Chavez MD, MD     [facility]  cc:   Hector Chavez MD  [address] [address]

## 2023-08-21 NOTE — PROGRESS NOTES
GLENDA is a 63 year old who is being evaluated via a billable video visit.      How would you like to obtain your AVS? Mail a copy  If the video visit is dropped, the invitation should be resent by: Other e-mail: [email or web address]  Will anyone else be joining your video visit? No        Review Of Systems  Respiratory: No shortness of breath, dyspnea on exertion, cough, or hemoptysis  Cardiovascular: negative    Patient reported vitals:  BP: 121/73  Heart rate: 72  Weight: 150lb    Fatuma Escobarewelina [facility], AAMA      Video-Visit Details    Type of service:  Video Visit   General:  no apparent distress, normal body habitus, sitting upright.  ENT/Mouth:  membranes moist, no nasal discharge.  Normal head shape, no apparent injury or laceration.  Eyes:  no scleral icterus, normal conjunctivae.  No observed jaundice.  Neck:  no apparent neck swelling.   Chest/Lungs:  No breathing difficulty while speaking.  No audible wheezing.  No cough during conversation.  Cardiovascular:  No obviously elevated jugular venous pressure.  No apparent edema bilaterally in LE.   Abdomen:  no obvious abdominal distention.   Extremities:  no apparent cyanosis.  Skin:  no xanthelasma.  No facial lacerations.  Neurologic:  Normal arm motion bilateral, no tremors.    Psychiatric:  Alert and oriented x3, calm demeanor    The rest of the comprehensive physical examination is deferred due to public health emergency video visit restrictions.     Originating Location (pt. Location): Home    Distant Location (provider location):  On-site  Platform used for Video Visit: Brenda

## 2023-08-26 ENCOUNTER — HEALTH MAINTENANCE LETTER (OUTPATIENT)
Age: 63
End: 2023-08-26

## 2023-09-12 DIAGNOSIS — I25.10 CORONARY ARTERY DISEASE INVOLVING NATIVE CORONARY ARTERY OF NATIVE HEART WITHOUT ANGINA PECTORIS: ICD-10-CM

## 2023-09-12 DIAGNOSIS — E78.5 HYPERLIPIDEMIA LDL GOAL <70: ICD-10-CM

## 2023-09-12 RX ORDER — LOSARTAN POTASSIUM 25 MG/1
12.5 TABLET ORAL DAILY
Qty: 45 TABLET | Refills: 3 | Status: SHIPPED | OUTPATIENT
Start: 2023-09-12

## 2023-09-12 RX ORDER — ROSUVASTATIN CALCIUM 20 MG/1
20 TABLET, COATED ORAL DAILY
Qty: 90 TABLET | Refills: 3 | Status: SHIPPED | OUTPATIENT
Start: 2023-09-12

## 2023-10-16 DIAGNOSIS — I21.09 ACUTE ANTERIOR WALL MI (H): ICD-10-CM

## 2023-10-16 DIAGNOSIS — I25.10 CORONARY ARTERY DISEASE INVOLVING NATIVE CORONARY ARTERY OF NATIVE HEART WITHOUT ANGINA PECTORIS: ICD-10-CM

## 2023-10-16 RX ORDER — METOPROLOL SUCCINATE 50 MG/1
50 TABLET, EXTENDED RELEASE ORAL DAILY
Qty: 90 TABLET | Refills: 3 | Status: SHIPPED | OUTPATIENT
Start: 2023-10-16

## 2023-11-15 ENCOUNTER — OFFICE VISIT (OUTPATIENT)
Dept: FAMILY MEDICINE | Facility: CLINIC | Age: 63
End: 2023-11-15
Payer: COMMERCIAL

## 2023-11-15 VITALS
RESPIRATION RATE: 16 BRPM | OXYGEN SATURATION: 98 % | HEART RATE: 80 BPM | HEIGHT: 63 IN | SYSTOLIC BLOOD PRESSURE: 133 MMHG | DIASTOLIC BLOOD PRESSURE: 86 MMHG | TEMPERATURE: 97.9 F | BODY MASS INDEX: 28.65 KG/M2 | WEIGHT: 161.7 LBS

## 2023-11-15 DIAGNOSIS — I25.10 CORONARY ARTERY DISEASE INVOLVING NATIVE HEART WITHOUT ANGINA PECTORIS, UNSPECIFIED VESSEL OR LESION TYPE: Chronic | ICD-10-CM

## 2023-11-15 DIAGNOSIS — E78.5 DYSLIPIDEMIA: Chronic | ICD-10-CM

## 2023-11-15 DIAGNOSIS — E03.9 HYPOTHYROIDISM, UNSPECIFIED TYPE: Chronic | ICD-10-CM

## 2023-11-15 DIAGNOSIS — R73.03 PREDIABETES: Chronic | ICD-10-CM

## 2023-11-15 DIAGNOSIS — E55.9 VITAMIN D DEFICIENCY: ICD-10-CM

## 2023-11-15 DIAGNOSIS — N18.31 STAGE 3A CHRONIC KIDNEY DISEASE (H): Primary | ICD-10-CM

## 2023-11-15 DIAGNOSIS — I10 ESSENTIAL HYPERTENSION: Chronic | ICD-10-CM

## 2023-11-15 DIAGNOSIS — Z23 NEED FOR VACCINATION: ICD-10-CM

## 2023-11-15 PROCEDURE — 82043 UR ALBUMIN QUANTITATIVE: CPT | Performed by: INTERNAL MEDICINE

## 2023-11-15 PROCEDURE — 90715 TDAP VACCINE 7 YRS/> IM: CPT | Performed by: INTERNAL MEDICINE

## 2023-11-15 PROCEDURE — 82306 VITAMIN D 25 HYDROXY: CPT | Performed by: INTERNAL MEDICINE

## 2023-11-15 PROCEDURE — 90471 IMMUNIZATION ADMIN: CPT | Performed by: INTERNAL MEDICINE

## 2023-11-15 PROCEDURE — 99214 OFFICE O/P EST MOD 30 MIN: CPT | Mod: 25 | Performed by: INTERNAL MEDICINE

## 2023-11-15 PROCEDURE — 82570 ASSAY OF URINE CREATININE: CPT | Performed by: INTERNAL MEDICINE

## 2023-11-15 PROCEDURE — 36415 COLL VENOUS BLD VENIPUNCTURE: CPT | Performed by: INTERNAL MEDICINE

## 2023-11-15 PROCEDURE — 84443 ASSAY THYROID STIM HORMONE: CPT | Performed by: INTERNAL MEDICINE

## 2023-11-15 ASSESSMENT — ENCOUNTER SYMPTOMS
CHILLS: 0
DYSURIA: 0
FEVER: 0
HEARTBURN: 0
PALPITATIONS: 0
HEADACHES: 0
NERVOUS/ANXIOUS: 0
JOINT SWELLING: 0
EYE PAIN: 0
DIARRHEA: 0
ABDOMINAL PAIN: 0
WEAKNESS: 0
COUGH: 0
SHORTNESS OF BREATH: 0
PARESTHESIAS: 0
CONSTIPATION: 0
HEMATURIA: 0
MYALGIAS: 0
ARTHRALGIAS: 0
FREQUENCY: 0
HEMATOCHEZIA: 0
NAUSEA: 0
DIZZINESS: 0
SORE THROAT: 0
BREAST MASS: 0

## 2023-11-15 ASSESSMENT — PAIN SCALES - GENERAL: PAINLEVEL: NO PAIN (0)

## 2023-11-15 NOTE — PROGRESS NOTES
"  Assessment & Plan     Stage 3a chronic kidney disease (H)  - Albumin Random Urine Quantitative with Creat Ratio    Hypothyroidism, unspecified type  Check TSH. Currently takes 137 mcg daily.   Previous TSH abnormal, may have to decrease dose if TSH still low.  - TSH  - TSH with free T4 reflex; Future    Dyslipidemia  Prediabetes  Stable. Continue medication.  Discussed diet and exercise.     Coronary artery disease involving native heart without angina pectoris, unspecified vessel or lesion type  Continue ASA and statin along with BB/ARB    Essential hypertension  Stable. Continue medication.    Vitamin D deficiency  Takes high dose 50,000 units daily for years. Will check levels and if high, will ask her to stop high dose and start a low dose daily.   - Vitamin D Deficiency; Future  - Vitamin D Deficiency    Need for vaccination  tdap       BMI:   Estimated body mass index is 28.64 kg/m  as calculated from the following:    Height as of this encounter: 1.6 m (5' 3\").    Weight as of this encounter: 73.3 kg (161 lb 11.2 oz).   Weight management plan: Discussed healthy diet and exercise guidelines    See Patient Instructions    Aliya Gallardo MD  [facility]    Misti DOSHI is a 63 year old, presenting for the following health issues:  Establish Care    HPI     Jaymie is here to establish care.   Cardiac arrest 13 years ago, from coronary artery spasm and rupture. S/p stent LAD   She is currently on ASA, BB/ ARB and statin.   TSH 3 months ago was low, Dr Latham discussed lowering the dose but they decided to recheck levels in 3 months.  Colonoscopy she has to schedule  Pap scheduled with Gyn   Mammogram scheduled for next week  Family hx: neg for breast oavrian or colon cancer.   Former smoker: quit 13 years ago, smoked 15 years about 1/4th pack or less.  ETOH: 4 drinks per week  She is active and eats healthy  She works in [place]. Lives with her .   She is due for pneumonia " "vaccine and will make an appt at the pharmacy. Done with flu shot. She is getting shingles vaccines with her .     Review of Systems       Objective    /86 (BP Location: Left arm, Patient Position: Sitting, Cuff Size: Adult Regular)   Pulse 80   Temp 97.9  F (36.6  C) (Oral)   Resp 16   Ht 1.6 m (5' 3\")   Wt 73.3 kg (161 lb 11.2 oz)   SpO2 98%   BMI 28.64 kg/m    Body mass index is 28.64 kg/m .  Physical Exam                 "

## 2023-11-15 NOTE — NURSING NOTE
Prior to immunization administration, verified patients identity using patient s name and date of birth. Please see Immunization Activity for additional information.     Screening Questionnaire for Adult Immunization    Are you sick today?   No   Do you have allergies to medications, food, a vaccine component or latex?   No   Have you ever had a serious reaction after receiving a vaccination?   No   Do you have a long-term health problem with heart, lung, kidney, or metabolic disease (e.g., diabetes), asthma, a blood disorder, no spleen, complement component deficiency, a cochlear implant, or a spinal fluid leak?  Are you on long-term aspirin therapy?   No   Do you have cancer, leukemia, HIV/AIDS, or any other immune system problem?   No   Do you have a parent, brother, or sister with an immune system problem?   No   In the past 3 months, have you taken medications that affect  your immune system, such as prednisone, other steroids, or anticancer drugs; drugs for the treatment of rheumatoid arthritis, Crohn s disease, or psoriasis; or have you had radiation treatments?   No   Have you had a seizure, or a brain or other nervous system problem?   No   During the past year, have you received a transfusion of blood or blood    products, or been given immune (gamma) globulin or antiviral drug?   No   For women: Are you pregnant or is there a chance you could become       pregnant during the next month?   No   Have you received any vaccinations in the past 4 weeks?   No     Immunization questionnaire answers were all negative.      Patient instructed to remain in clinic for 15 minutes afterwards, and to report any adverse reactions.     Screening performed by Gwendolyn Valverde MA on 11/15/2023 at 4:58 PM.

## 2023-11-15 NOTE — PROGRESS NOTES
SUBJECTIVE:   GLENDA is a 63 year old, presenting for the following:  No chief complaint on file.    {(!) Visit Details have not yet been documented.  Please enter Visit Details and then use this list to pull in documentation. (Optional):155350}    Healthy Habits:     Getting at least 3 servings of Calcium per day:  Yes    Bi-annual eye exam:  NO    Dental care twice a year:  Yes    Sleep apnea or symptoms of sleep apnea:  None    Diet:  Regular (no restrictions)    Frequency of exercise:  4-5 days/week    Duration of exercise:  45-60 minutes    Taking medications regularly:  Yes    Medication side effects:  None    Additional concerns today:  No          {Add if <65 person on Medicare  - Required Questions (Optional):748984}  {Outside tests to abstract? (Optional):274913}    {additional problems to add (Optional):976091}  Have you ever done Advance Care Planning? (For example, a Health Directive, POLST, or a discussion with a medical provider or your loved ones about your wishes): { :801633}    Social History     Tobacco Use    Smoking status: Former     Packs/day: 0.20     Years: 15.00     Additional pack years: 0.00     Total pack years: 3.00     Types: Cigarettes     Start date:      Quit date: 2010     Years since quittin.7    Smokeless tobacco: Never   Substance Use Topics    Alcohol use: Yes     Comment: Occasional glass of wine.     {Rooming staff  Click this link to complete the Prescreen if response below is not for today's visit  Alcohol Use Prescreen >3 drinks/day or > 7 drinks/week.  If the prescreen question answer is YES, complete the full AUDIT  :542030}        2023     9:20 AM   Alcohol Use   Prescreen: >3 drinks/day or >7 drinks/week? No   {add AUDIT responses (Optional) (A score of 7 for adult men is an indication of hazardous drinking; a score of 8 or more is an indication of an alcohol use disorder.  A score of 7 or more for adult women is an indication of hazardous  drinking or an alchohol use disorder):771591}  Reviewed orders with patient.  Reviewed health maintenance and updated orders accordingly - { :886020}  {Chronicprobdata (optional):531478}    Breast Cancer Screenin/8/2023     9:21 AM   Breast CA Risk Assessment (FHS-7)   Do you have a family history of breast, colon, or ovarian cancer? No / Unknown       {If any of the questions to the BCRA (FHS-7) are answered yes, consider ordering referral for genetic counseling (Optional) :426125}  {AMB Mammogram Decision Support (Optional) :887950}  Pertinent mammograms are reviewed under the imaging tab.    History of abnormal Pap smear: { :212390}      Latest Ref Rng & Units 9/15/2016    12:00 AM 2010    12:00 AM   PAP / HPV   PAP (Historical) Negative Negative     NIL        This result is from an external source.     Reviewed and updated as needed this visit by clinical staff                  Reviewed and updated as needed this visit by Provider                 {HISTORY OPTIONS (Optional):327044}    Review of Systems   Constitutional:  Negative for chills and fever.   HENT:  Negative for congestion, ear pain, hearing loss and sore throat.    Eyes:  Negative for pain and visual disturbance.   Respiratory:  Negative for cough and shortness of breath.    Cardiovascular:  Negative for chest pain, palpitations and peripheral edema.   Gastrointestinal:  Negative for abdominal pain, constipation, diarrhea, heartburn, hematochezia and nausea.   Breasts:  Negative for tenderness, breast mass and discharge.   Genitourinary:  Negative for dysuria, frequency, genital sores, hematuria, pelvic pain, urgency, vaginal bleeding and vaginal discharge.   Musculoskeletal:  Negative for arthralgias, joint swelling and myalgias.   Skin:  Negative for rash.   Neurological:  Negative for dizziness, weakness, headaches and paresthesias.   Psychiatric/Behavioral:  Negative for mood changes. The patient is not nervous/anxious.   "    {FEMALE ROS (Optional):274298}     OBJECTIVE:   There were no vitals taken for this visit.  Physical Exam  {Exam Choices (Optional):017649}    {Diagnostic Test Results (Optional):791686}    ASSESSMENT/PLAN:   {Diag Picklist:908263}    {Patient advised of split billing (Optional):400858}      COUNSELING:  {FEMALE COUNSELING MESSAGES:203583::\"Reviewed preventive health counseling, as reflected in patient instructions\"}      BMI:   Estimated body mass index is 27.37 kg/m  as calculated from the following:    Height as of 8/11/23: 1.619 m (5' 3.75\").    Weight as of 8/11/23: 71.8 kg (158 lb 3.2 oz).   {Weight Management Plan needed for ACO:415642}      She reports that she quit smoking about 13 years ago. Her smoking use included cigarettes. She started smoking about 43 years ago. She has a 3.00 pack-year smoking history. She has never used smokeless tobacco.      {Counseling Resources  [place] Preventive Services Task Force  Cholesterol Screening  Health diet/nutrition  Pooled Cohorts Equation Calculator  USDA's MyPlate  ASA Prophylaxis  Lung CA Screening  Osteoporosis prevention/bone health :843668}  {Breast Cancer Risk Calculator  BRCA-Related Cancer Risk Assessment FHS-7 Tool :222739}  Aliya Gallardo MD  [facility]  "

## 2023-11-16 LAB
CREAT UR-MCNC: 30.3 MG/DL
MICROALBUMIN UR-MCNC: <12 MG/L
MICROALBUMIN/CREAT UR: NORMAL MG/G{CREAT}
TSH SERPL DL<=0.005 MIU/L-ACNC: 0.19 UIU/ML (ref 0.3–4.2)
VIT D+METAB SERPL-MCNC: 113 NG/ML (ref 20–50)

## 2023-11-17 ENCOUNTER — TELEPHONE (OUTPATIENT)
Dept: FAMILY MEDICINE | Facility: CLINIC | Age: 63
End: 2023-11-17
Payer: COMMERCIAL

## 2023-11-17 DIAGNOSIS — E03.9 HYPOTHYROIDISM, UNSPECIFIED TYPE: Primary | ICD-10-CM

## 2023-11-17 DIAGNOSIS — E03.9 HYPOTHYROIDISM, UNSPECIFIED TYPE: Chronic | ICD-10-CM

## 2023-11-17 RX ORDER — LEVOTHYROXINE SODIUM 125 UG/1
125 TABLET ORAL DAILY
Qty: 90 TABLET | Refills: 0 | Status: SHIPPED | OUTPATIENT
Start: 2023-11-17 | End: 2024-03-15

## 2023-11-17 RX ORDER — LEVOTHYROXINE SODIUM 125 UG/1
125 TABLET ORAL DAILY
Qty: 90 TABLET | Refills: 0 | Status: SHIPPED | OUTPATIENT
Start: 2023-11-17 | End: 2023-11-17

## 2023-11-17 NOTE — TELEPHONE ENCOUNTER
"Per the result note from 11/15/23...    \"As discussed at the time of visit, you can take 125 mcg daily for mon-Friday and take 137 mcg on Saturday and Sunday. \"    Currently sig states to take the medication daily. Triage RN unable to update sig. Will route to PCP for review.     Sarah Henderson RN  "

## 2023-11-17 NOTE — TELEPHONE ENCOUNTER
Disp Refills Start End BLUE    levothyroxine (SYNTHROID/LEVOTHROID) 125 MCG tablet 90 tablet 0 11/17/2023  No   Sig - Route: TAKE 1 TABLET BY MOUTH EVERY DAY - Oral   Notes to Pharmacy: Take 125 mcg daily from Monday to Friday and take 137 mcg on Saturday and Sunday         Fax received from pharmacy regarding Levothyroxine. SIG and RX note do not match, which is correct.   Also addressed in additional encounter from same day (11/17/23).

## 2023-11-19 PROBLEM — Z23 NEED FOR VACCINATION: Status: ACTIVE | Noted: 2023-11-19

## 2023-11-21 RX ORDER — LEVOTHYROXINE SODIUM 125 UG/1
125 TABLET ORAL DAILY
Qty: 90 TABLET | Refills: 0 | Status: SHIPPED | OUTPATIENT
Start: 2023-11-21 | End: 2024-03-15

## 2023-11-21 RX ORDER — LEVOTHYROXINE SODIUM 137 UG/1
137 TABLET ORAL DAILY
Qty: 90 TABLET | Refills: 0 | Status: SHIPPED | OUTPATIENT
Start: 2023-11-21 | End: 2024-03-15

## 2023-11-21 NOTE — TELEPHONE ENCOUNTER
Routing to PCP to please advise if levothyroxine can be ordered separately with corresponding dosages? Per chart review appears pharmacy is unable to have different sig than how medication dosage is ordered.     Pended 125 mcg Monday - Friday     Pended 137 mcg Saturday & Sunday     Please review and sign if correct - thank you!     Mikhail Osuna RN  [facility]

## 2023-12-06 ENCOUNTER — ANCILLARY PROCEDURE (OUTPATIENT)
Dept: MAMMOGRAPHY | Facility: CLINIC | Age: 63
End: 2023-12-06
Attending: INTERNAL MEDICINE
Payer: COMMERCIAL

## 2023-12-06 DIAGNOSIS — Z12.31 VISIT FOR SCREENING MAMMOGRAM: ICD-10-CM

## 2023-12-06 PROCEDURE — 77067 SCR MAMMO BI INCL CAD: CPT | Mod: TC | Performed by: STUDENT IN AN ORGANIZED HEALTH CARE EDUCATION/TRAINING PROGRAM

## 2023-12-06 PROCEDURE — 77063 BREAST TOMOSYNTHESIS BI: CPT | Mod: TC | Performed by: STUDENT IN AN ORGANIZED HEALTH CARE EDUCATION/TRAINING PROGRAM

## 2024-01-14 DIAGNOSIS — E03.9 HYPOTHYROIDISM, UNSPECIFIED TYPE: ICD-10-CM

## 2024-01-15 RX ORDER — LEVOTHYROXINE SODIUM 125 UG/1
125 TABLET ORAL DAILY
Qty: 90 TABLET | Refills: 0 | OUTPATIENT
Start: 2024-01-15

## 2024-01-16 NOTE — TELEPHONE ENCOUNTER
Spoke with patient to relay PCP message. Patient verbalized understanding and stated she will contact clinic at a later time to make lab appt. Patient did not have any additional questions or concerns.

## 2024-03-08 ENCOUNTER — LAB (OUTPATIENT)
Dept: LAB | Facility: CLINIC | Age: 64
End: 2024-03-08
Payer: COMMERCIAL

## 2024-03-08 DIAGNOSIS — E03.9 HYPOTHYROIDISM, UNSPECIFIED TYPE: ICD-10-CM

## 2024-03-08 LAB
T4 FREE SERPL-MCNC: 1.65 NG/DL (ref 0.9–1.7)
TSH SERPL DL<=0.005 MIU/L-ACNC: 0.2 UIU/ML (ref 0.3–4.2)

## 2024-03-08 PROCEDURE — 36415 COLL VENOUS BLD VENIPUNCTURE: CPT

## 2024-03-08 PROCEDURE — 84439 ASSAY OF FREE THYROXINE: CPT

## 2024-03-08 PROCEDURE — 84443 ASSAY THYROID STIM HORMONE: CPT

## 2024-03-14 ENCOUNTER — MYC MEDICAL ADVICE (OUTPATIENT)
Dept: FAMILY MEDICINE | Facility: CLINIC | Age: 64
End: 2024-03-14
Payer: COMMERCIAL

## 2024-03-14 DIAGNOSIS — E03.9 HYPOTHYROIDISM, UNSPECIFIED TYPE: ICD-10-CM

## 2024-03-15 RX ORDER — LEVOTHYROXINE SODIUM 125 UG/1
125 TABLET ORAL DAILY
Qty: 90 TABLET | Refills: 0 | Status: SHIPPED | OUTPATIENT
Start: 2024-03-15 | End: 2024-07-19

## 2024-03-30 ENCOUNTER — OFFICE VISIT (OUTPATIENT)
Dept: URGENT CARE | Facility: URGENT CARE | Age: 64
End: 2024-03-30
Payer: COMMERCIAL

## 2024-03-30 VITALS
SYSTOLIC BLOOD PRESSURE: 132 MMHG | TEMPERATURE: 98.1 F | OXYGEN SATURATION: 99 % | HEART RATE: 97 BPM | RESPIRATION RATE: 16 BRPM | WEIGHT: 158 LBS | BODY MASS INDEX: 27.99 KG/M2 | DIASTOLIC BLOOD PRESSURE: 90 MMHG

## 2024-03-30 DIAGNOSIS — S61.209A FINGERTIP AVULSION, INITIAL ENCOUNTER: Primary | ICD-10-CM

## 2024-03-30 PROCEDURE — 17250 CHEM CAUT OF GRANLTJ TISSUE: CPT | Performed by: FAMILY MEDICINE

## 2024-03-30 NOTE — PROGRESS NOTES
SUBJECTIVE: @[email or web address]@.ident who presents to the clinic with a laceration on the finger sustained 1 hour(s) ago.    This is a non-work related and accidental injury.    Mechanism of injury: knife.  Associated symptoms: Denies numbness, weakness, or loss of function  Last tetanus booster within 10 years: yes    Past Medical History:   Diagnosis Date    Cardiac arrest (H)     V fib cardiac arrest. 2010    Coronary artery disease     Hyperlipidaemia     Hypothyroidism     Ischemic cardiomyopathy     EF now normalized    Myocardial infarction (H)     Anterior MI 2010     Allergies   Allergen Reactions    Lisinopril      hypotension    Sulfa Antibiotics     Zithromax [Azithromycin]      Social History     Tobacco Use    Smoking status: Former     Packs/day: 0.20     Years: 15.00     Additional pack years: 0.00     Total pack years: 3.00     Types: Cigarettes     Start date:      Quit date: 2010     Years since quittin.1    Smokeless tobacco: Never   Substance Use Topics    Alcohol use: Yes     Comment: Occasional glass of wine.       ROS:  Neuro: good distal sensation  Motor: normal rom and strenght  Hem: capillary refill < 2 sec    EXAM: The patient appears today in alert,no apparent distress distressVITALS:   BP (!) 132/90 (BP Location: Left arm, Patient Position: Sitting, Cuff Size: Adult Regular)   Pulse 97   Temp 98.1  F (36.7  C) (Oral)   Resp 16   Wt 71.7 kg (158 lb)   SpO2 99%   BMI 27.99 kg/m    Size of laceration: 1 centimeters  Characteristics of the laceration: clean and curved  Tendon function intact: yes  Sensation to light touch intact: yes  Pulses/Capillary refill intact: yes      ICD-10-CM    1. Fingertip avulsion, initial encounter  S61.209A CHEM CAUTERY GRANULATION TISSUE          Procedure Note:Wound injected with 1 cc's of Lidocaine 1% plain  Good anesthesia was obtainedPrepped and draped in the usual sterile fashion  Wound cleaned with sterile water  Wound  irrigatedLaceration was lumican times 3 without bleeding  After care instructions:Keep wound clean   Signs of infection discussed today

## 2024-05-09 ENCOUNTER — TRANSFERRED RECORDS (OUTPATIENT)
Dept: HEALTH INFORMATION MANAGEMENT | Facility: CLINIC | Age: 64
End: 2024-05-09
Payer: COMMERCIAL

## 2024-05-13 ENCOUNTER — PATIENT OUTREACH (OUTPATIENT)
Dept: GASTROENTEROLOGY | Facility: CLINIC | Age: 64
End: 2024-05-13
Payer: COMMERCIAL

## 2024-06-01 ENCOUNTER — HEALTH MAINTENANCE LETTER (OUTPATIENT)
Age: 64
End: 2024-06-01

## 2024-06-14 ENCOUNTER — MYC MEDICAL ADVICE (OUTPATIENT)
Dept: INTERNAL MEDICINE | Facility: CLINIC | Age: 64
End: 2024-06-14
Payer: COMMERCIAL

## 2024-07-19 DIAGNOSIS — E03.9 HYPOTHYROIDISM, UNSPECIFIED TYPE: ICD-10-CM

## 2024-07-19 RX ORDER — LEVOTHYROXINE SODIUM 125 UG/1
125 TABLET ORAL DAILY
Qty: 90 TABLET | Refills: 0 | Status: SHIPPED | OUTPATIENT
Start: 2024-07-19

## 2024-09-23 DIAGNOSIS — E78.5 HYPERLIPIDEMIA LDL GOAL <70: ICD-10-CM

## 2024-09-23 DIAGNOSIS — I10 ESSENTIAL HYPERTENSION: ICD-10-CM

## 2024-09-23 DIAGNOSIS — I25.10 CORONARY ARTERY DISEASE INVOLVING NATIVE CORONARY ARTERY OF NATIVE HEART WITHOUT ANGINA PECTORIS: Primary | ICD-10-CM

## 2024-09-23 DIAGNOSIS — I25.5 ISCHEMIC CARDIOMYOPATHY: ICD-10-CM

## 2024-09-24 ENCOUNTER — LAB (OUTPATIENT)
Dept: LAB | Facility: CLINIC | Age: 64
End: 2024-09-24
Payer: COMMERCIAL

## 2024-09-24 ENCOUNTER — HOSPITAL ENCOUNTER (OUTPATIENT)
Dept: CARDIOLOGY | Facility: CLINIC | Age: 64
Discharge: HOME | End: 2024-09-24
Payer: COMMERCIAL

## 2024-09-24 DIAGNOSIS — I10 ESSENTIAL HYPERTENSION: ICD-10-CM

## 2024-09-24 DIAGNOSIS — E78.5 HYPERLIPIDEMIA LDL GOAL <70: ICD-10-CM

## 2024-09-24 DIAGNOSIS — I25.5 ISCHEMIC CARDIOMYOPATHY: ICD-10-CM

## 2024-09-24 DIAGNOSIS — I25.10 CORONARY ARTERY DISEASE INVOLVING NATIVE CORONARY ARTERY OF NATIVE HEART WITHOUT ANGINA PECTORIS: ICD-10-CM

## 2024-09-24 LAB
ALT SERPL W P-5'-P-CCNC: 27 U/L (ref 0–50)
ANION GAP SERPL CALCULATED.3IONS-SCNC: 11 MMOL/L (ref 7–15)
BUN SERPL-MCNC: 21.2 MG/DL (ref 8–23)
CALCIUM SERPL-MCNC: 9.8 MG/DL (ref 8.8–10.4)
CHLORIDE SERPL-SCNC: 105 MMOL/L (ref 98–107)
CHOLEST SERPL-MCNC: 175 MG/DL
CREAT SERPL-MCNC: 1.11 MG/DL (ref 0.51–0.95)
EGFRCR SERPLBLD CKD-EPI 2021: 56 ML/MIN/1.73M2
FASTING STATUS PATIENT QL REPORTED: YES
GLUCOSE SERPL-MCNC: 101 MG/DL (ref 70–99)
HCO3 SERPL-SCNC: 24 MMOL/L (ref 22–29)
HDLC SERPL-MCNC: 88 MG/DL
LDLC SERPL CALC-MCNC: 65 MG/DL
LVEF ECHO: NORMAL
NONHDLC SERPL-MCNC: 87 MG/DL
POTASSIUM SERPL-SCNC: 4.4 MMOL/L (ref 3.4–5.3)
SODIUM SERPL-SCNC: 140 MMOL/L (ref 135–145)
TRIGL SERPL-MCNC: 111 MG/DL

## 2024-09-24 PROCEDURE — 36415 COLL VENOUS BLD VENIPUNCTURE: CPT

## 2024-09-24 PROCEDURE — 93306 TTE W/DOPPLER COMPLETE: CPT

## 2024-09-24 PROCEDURE — 84460 ALANINE AMINO (ALT) (SGPT): CPT

## 2024-09-24 PROCEDURE — 80048 BASIC METABOLIC PNL TOTAL CA: CPT

## 2024-09-24 PROCEDURE — 93306 TTE W/DOPPLER COMPLETE: CPT | Mod: 26 | Performed by: INTERNAL MEDICINE

## 2024-09-24 PROCEDURE — 80061 LIPID PANEL: CPT

## 2024-10-02 ENCOUNTER — TELEPHONE (OUTPATIENT)
Dept: CARDIOLOGY | Facility: CLINIC | Age: 64
End: 2024-10-02
Payer: COMMERCIAL

## 2024-10-02 NOTE — TELEPHONE ENCOUNTER
"Reviewed labs showing    Latest Reference Range & Units 09/24/24 07:47   Sodium 135 - 145 mmol/L 140   Potassium 3.4 - 5.3 mmol/L 4.4   Chloride 98 - 107 mmol/L 105   Carbon Dioxide (CO2) 22 - 29 mmol/L 24   Urea Nitrogen 8.0 - 23.0 mg/dL 21.2   Creatinine 0.51 - 0.95 mg/dL 1.11 (H)   GFR Estimate >60 mL/min/1.73m2 56 (L)   Calcium 8.8 - 10.4 mg/dL 9.8   Anion Gap 7 - 15 mmol/L 11   ALT 0 - 50 U/L 27   Cholesterol <200 mg/dL 175   Patient Fasting?  Yes   Glucose 70 - 99 mg/dL 101 (H)   HDL Cholesterol >=50 mg/dL 88   LDL Cholesterol Calculated <100 mg/dL 65   Non HDL Cholesterol <130 mg/dL 87   Triglycerides <150 mg/dL 111   (H): Data is abnormally high  (L): Data is abnormally low    Reviewed echo showing   Left ventricular systolic function is normal.  The visual ejection fraction is 60-65%.  No regional wall motion abnormalities.  Normal right ventricular size and systolic function.  No significant valve disease.   - Compared to prior study, there is no significant change.    Per office note dated 8/21/23, Dr. Chavez recommended, \"We will arrange to see the patient back again in 1 year's time. We will check an echocardiogram, lipids and basic metabolic profile with hepatic function at that time. As always, however, the patient has been told to contact us if she has any questions or any concerns.\"    Pt has appt 1/03/25 w/ Dr. Chavez; will message to review. Michelle WARE     "

## 2024-10-02 NOTE — TELEPHONE ENCOUNTER
Left ventricular systolic function is normal.  Patient previously had an acute anterior myocardial infarct.  Lipids are excellent with no evidence of hepatic toxicity.  Her renal function is still slightly off.  Should maintain good hydration.  Otherwise electrolytes look  good.  Thanks

## 2024-10-03 NOTE — TELEPHONE ENCOUNTER
Results and recommendations sent to patient via result note with request to follow-up in January as scheduled or to reply or call with questions or concerns.  AAMIR Ramires RN

## 2024-10-10 ENCOUNTER — MYC MEDICAL ADVICE (OUTPATIENT)
Dept: FAMILY MEDICINE | Facility: CLINIC | Age: 64
End: 2024-10-10
Payer: COMMERCIAL

## 2024-10-10 ENCOUNTER — MYC REFILL (OUTPATIENT)
Dept: FAMILY MEDICINE | Facility: CLINIC | Age: 64
End: 2024-10-10
Payer: COMMERCIAL

## 2024-10-10 DIAGNOSIS — E03.9 HYPOTHYROIDISM, UNSPECIFIED TYPE: ICD-10-CM

## 2024-10-10 RX ORDER — LEVOTHYROXINE SODIUM 125 UG/1
125 TABLET ORAL DAILY
Qty: 90 TABLET | Refills: 0 | Status: SHIPPED | OUTPATIENT
Start: 2024-10-10

## 2024-10-10 NOTE — TELEPHONE ENCOUNTER
Spoke with patient and offered an appointment on 11/04/2024 but pt is going to be out of town. She is scheduled for 01/09/2025. Please advise if able to send refills.

## 2024-10-30 DIAGNOSIS — E78.5 HYPERLIPIDEMIA LDL GOAL <70: ICD-10-CM

## 2024-10-30 DIAGNOSIS — I21.09 ACUTE ANTERIOR WALL MI (H): ICD-10-CM

## 2024-10-30 DIAGNOSIS — I25.10 CORONARY ARTERY DISEASE INVOLVING NATIVE CORONARY ARTERY OF NATIVE HEART WITHOUT ANGINA PECTORIS: ICD-10-CM

## 2024-10-30 RX ORDER — METOPROLOL SUCCINATE 50 MG/1
50 TABLET, EXTENDED RELEASE ORAL DAILY
Qty: 90 TABLET | Refills: 0 | Status: SHIPPED | OUTPATIENT
Start: 2024-10-30

## 2024-10-30 RX ORDER — ROSUVASTATIN CALCIUM 20 MG/1
20 TABLET, COATED ORAL DAILY
Qty: 90 TABLET | Refills: 0 | Status: SHIPPED | OUTPATIENT
Start: 2024-10-30

## 2024-10-30 RX ORDER — LOSARTAN POTASSIUM 25 MG/1
12.5 TABLET ORAL DAILY
Qty: 45 TABLET | Refills: 0 | Status: SHIPPED | OUTPATIENT
Start: 2024-10-30

## 2025-01-20 ENCOUNTER — OFFICE VISIT (OUTPATIENT)
Dept: FAMILY MEDICINE | Facility: CLINIC | Age: 65
End: 2025-01-20
Payer: COMMERCIAL

## 2025-01-20 VITALS
HEART RATE: 67 BPM | HEIGHT: 63 IN | WEIGHT: 161.7 LBS | DIASTOLIC BLOOD PRESSURE: 79 MMHG | TEMPERATURE: 97.1 F | BODY MASS INDEX: 28.65 KG/M2 | OXYGEN SATURATION: 98 % | SYSTOLIC BLOOD PRESSURE: 128 MMHG | RESPIRATION RATE: 16 BRPM

## 2025-01-20 DIAGNOSIS — E03.9 HYPOTHYROIDISM, UNSPECIFIED TYPE: Primary | ICD-10-CM

## 2025-01-20 DIAGNOSIS — N18.31 STAGE 3A CHRONIC KIDNEY DISEASE (H): ICD-10-CM

## 2025-01-20 LAB
CREAT UR-MCNC: 158 MG/DL
MICROALBUMIN UR-MCNC: <12 MG/L
MICROALBUMIN/CREAT UR: NORMAL MG/G{CREAT}
T4 FREE SERPL-MCNC: 1.48 NG/DL (ref 0.9–1.7)
TSH SERPL DL<=0.005 MIU/L-ACNC: 0.21 UIU/ML (ref 0.3–4.2)

## 2025-01-20 PROCEDURE — 84439 ASSAY OF FREE THYROXINE: CPT | Performed by: INTERNAL MEDICINE

## 2025-01-20 PROCEDURE — 36415 COLL VENOUS BLD VENIPUNCTURE: CPT | Performed by: INTERNAL MEDICINE

## 2025-01-20 PROCEDURE — 82043 UR ALBUMIN QUANTITATIVE: CPT | Performed by: INTERNAL MEDICINE

## 2025-01-20 PROCEDURE — 84443 ASSAY THYROID STIM HORMONE: CPT | Performed by: INTERNAL MEDICINE

## 2025-01-20 PROCEDURE — 82570 ASSAY OF URINE CREATININE: CPT | Performed by: INTERNAL MEDICINE

## 2025-01-20 NOTE — PROGRESS NOTES
"  {PROVIDER CHARTING PREFERENCE:545880}    Subjective   Jaymie is a 64 year old, presenting for the following health issues:  RECHECK    History of Present Illness       Reason for visit:  Follow up thyroid      64-year-old patient with a past history of a anterior myocardial infarct complicated by cardiac arrest and resuscitated and who underwent stenting of the left anterior descending artery     Obgyn for pap    Former smoker: socially smoked for 20 years.           Objective    /79 (BP Location: Left arm, Patient Position: Sitting, Cuff Size: Adult Regular)   Pulse 67   Temp 97.1  F (36.2  C)   Resp 16   Ht 1.6 m (5' 3\")   Wt 73.3 kg (161 lb 11.2 oz)   SpO2 98%   BMI 28.64 kg/m    Body mass index is 28.64 kg/m .  Physical Exam           Signed Electronically by: Aliya Gallardo MD  {Email feedback regarding this note to [email or web address]   :[id number]}  "

## 2025-01-20 NOTE — PATIENT INSTRUCTIONS
You are due for a mammogram  Please call the following number to make appointment :  [phone]  It is located in [address]

## 2025-02-22 ENCOUNTER — HEALTH MAINTENANCE LETTER (OUTPATIENT)
Age: 65
End: 2025-02-22

## 2025-04-07 DIAGNOSIS — E03.9 HYPOTHYROIDISM, UNSPECIFIED TYPE: ICD-10-CM

## 2025-04-07 RX ORDER — LEVOTHYROXINE SODIUM 112 UG/1
112 TABLET ORAL DAILY
Qty: 90 TABLET | Refills: 2 | Status: SHIPPED | OUTPATIENT
Start: 2025-04-07

## 2025-05-01 NOTE — PROGRESS NOTES
Assessment & Plan     Hypothyroidism, unspecified type  Check labs  Denies SE on 150mcg daily  Will make adjustments if indicated based on results  - TSH with free T4 reflex    Essential hypertension  Above goal today, monitor  Continue cardio care  - Comprehensive metabolic panel (BMP + Alb, Alk Phos, ALT, AST, Total. Bili, TP)  - CBC with Platelets & Differential    Coronary artery disease involving native heart without angina pectoris, unspecified vessel or lesion type  Dyslipidemia  History of cardiac arrest   Following cardio   On asa/arb/statin/bb    Impaired fasting glucose  Check a1c    Stage 3a chronic kidney disease (H)  Seen on labs chronically, discussed. Avoid daily/chronic NSAID use such as naproxen/ibuprofen, periodically is ok.   Discussed monitoring yearly for stability    Encounter for screening mammogram for breast cancer  Overdue:  - MA Screen Bilateral w/[facility]      Return in about 1 year (around 5/9/2023) for Routine preventive, with me, in person, sooner if symptoms worsen or do not improve.    Linda Latham DO  [facility] LEONEL DOSHI is a 61 year old who presents for the following health issues     History of Present Illness       Reason for visit:  Find new primary doctor  Symptoms include:  None    She eats 2-3 servings of fruits and vegetables daily.She consumes 0 sweetened beverage(s) daily.She exercises with enough effort to increase her heart rate 30 to 60 minutes per day.  She exercises with enough effort to increase her heart rate 5 days per week.   She is taking medications regularly.     Former PCP: ariela  Specialists: cardio  Problem list and medications reviewed and updated. See below for additional notes.  Social: nonsmoker, occasional etoh    Interested in establishing care  Sees cardio for heart hx (MI/cardiac arrest).  States on thyroid rx at least 15-20 years, last one year on higher dose of 150mcg daily, denies SE or palpations.  Agreeable to  "update labs      Review of Systems   Constitutional, HEENT, cardiovascular, pulmonary, gi and gu systems are negative, except as otherwise noted.      Objective    BP (!) 155/79 (BP Location: Right arm, Patient Position: Sitting, Cuff Size: Adult Regular)   Pulse 96   Temp 97.2  F (36.2  C)   Resp 16   Ht 1.619 m (5' 3.75\")   Wt 73 kg (161 lb)   SpO2 98%   BMI 27.85 kg/m    Body mass index is 27.85 kg/m .  Physical Exam     GENERAL APPEARANCE: AAOx3, no distress. Well developed.    PSYCH: appropriate mood and affect.           " 1-2 cups/cans per day

## 2025-07-31 ENCOUNTER — PATIENT OUTREACH (OUTPATIENT)
Dept: CARE COORDINATION | Facility: CLINIC | Age: 65
End: 2025-07-31
Payer: COMMERCIAL